# Patient Record
Sex: MALE | Race: WHITE | NOT HISPANIC OR LATINO | Employment: FULL TIME | URBAN - METROPOLITAN AREA
[De-identification: names, ages, dates, MRNs, and addresses within clinical notes are randomized per-mention and may not be internally consistent; named-entity substitution may affect disease eponyms.]

---

## 2018-04-02 ENCOUNTER — OFFICE VISIT (OUTPATIENT)
Dept: URGENT CARE | Facility: PHYSICIAN GROUP | Age: 38
End: 2018-04-02
Payer: COMMERCIAL

## 2018-04-02 VITALS
SYSTOLIC BLOOD PRESSURE: 140 MMHG | WEIGHT: 250 LBS | HEART RATE: 88 BPM | OXYGEN SATURATION: 94 % | BODY MASS INDEX: 33.13 KG/M2 | DIASTOLIC BLOOD PRESSURE: 78 MMHG | RESPIRATION RATE: 16 BRPM | TEMPERATURE: 98.6 F | HEIGHT: 73 IN

## 2018-04-02 DIAGNOSIS — J06.9 UPPER RESPIRATORY TRACT INFECTION, UNSPECIFIED TYPE: ICD-10-CM

## 2018-04-02 PROCEDURE — 99214 OFFICE O/P EST MOD 30 MIN: CPT | Performed by: PHYSICIAN ASSISTANT

## 2018-04-02 RX ORDER — FLUTICASONE PROPIONATE 50 MCG
1 SPRAY, SUSPENSION (ML) NASAL DAILY
Qty: 16 G | Refills: 0 | Status: SHIPPED | OUTPATIENT
Start: 2018-04-02 | End: 2019-05-15

## 2018-04-02 ASSESSMENT — ENCOUNTER SYMPTOMS
FEVER: 0
CHILLS: 0
RHINORRHEA: 1
DIARRHEA: 0
ABDOMINAL PAIN: 0
MYALGIAS: 0
SPUTUM PRODUCTION: 1
EYE REDNESS: 0
HEADACHES: 0
COUGH: 1
WHEEZING: 0
NECK PAIN: 0
SWOLLEN GLANDS: 0
DIZZINESS: 0
SHORTNESS OF BREATH: 0
TINGLING: 0
VOMITING: 0
EYE DISCHARGE: 0
SORE THROAT: 1

## 2018-04-02 ASSESSMENT — PAIN SCALES - GENERAL: PAINLEVEL: NO PAIN

## 2018-04-02 NOTE — LETTER
April 2, 2018         Patient: Dennis Anderson   YOB: 1980   Date of Visit: 4/2/2018           To Whom it May Concern:    Dennis Anderson was seen in my clinic on 4/2/2018. Please excuse this patient from work due to recent illness, he may return tomorrow only if symptoms have improved .    If you have any questions or concerns, please don't hesitate to call.        Sincerely,           Denis Vegas P.A.-C.  Electronically Signed

## 2018-04-02 NOTE — PROGRESS NOTES
"Subjective:      Dennis Anderson is a 38 y.o. male who presents with Flu Like Symptoms (x 5 days)            URI    This is a new problem. Episode onset: 5 days ago. The problem has been unchanged. Maximum temperature: Pos.. for subjective fevers  Associated symptoms include congestion, coughing, a plugged ear sensation, rhinorrhea and a sore throat. Pertinent negatives include no abdominal pain, chest pain, diarrhea, dysuria, ear pain, headaches, neck pain, rash, swollen glands, vomiting or wheezing. Treatments tried: Mucinex, Dayquil. The treatment provided moderate relief.       Review of Systems   Constitutional: Negative for chills, fever and malaise/fatigue.   HENT: Positive for congestion, rhinorrhea and sore throat. Negative for ear discharge and ear pain.    Eyes: Negative for discharge and redness.   Respiratory: Positive for cough and sputum production. Negative for shortness of breath and wheezing.    Cardiovascular: Negative for chest pain and leg swelling.   Gastrointestinal: Negative for abdominal pain, diarrhea and vomiting.   Genitourinary: Negative for dysuria and urgency.   Musculoskeletal: Negative for myalgias and neck pain.   Skin: Negative for itching and rash.   Neurological: Negative for dizziness, tingling and headaches.          Objective:     /78   Pulse 88   Temp 37 °C (98.6 °F)   Resp 16   Ht 1.854 m (6' 1\")   Wt 113.4 kg (250 lb)   SpO2 94%   BMI 32.98 kg/m²    PMH:  has no past medical history on file.  MEDS:   Current Outpatient Prescriptions:   •  fluticasone (FLONASE) 50 MCG/ACT nasal spray, Spray 1 Spray in nose every day., Disp: 16 g, Rfl: 0  •  azelastine (ASTELIN) 137 MCG/SPRAY nasal spray, Spray 2 Sprays in nose 2 times a day., Disp: 30 mL, Rfl: 1  ALLERGIES: No Known Allergies  SURGHX: No past surgical history on file.  SOCHX:  reports that he has never smoked. He has never used smokeless tobacco. He reports that he does not drink alcohol or use drugs.  FH: Family " history was reviewed, no pertinent findings to report    Physical Exam   Constitutional: He is oriented to person, place, and time. He appears well-developed and well-nourished.   HENT:   Head: Normocephalic and atraumatic.   Mouth/Throat: No oropharyngeal exudate.   Ears- Canals clear- TM- with clear fluid effusions bilaterally.   Pos. PND, with slight erythema- without tonsillar edema or exudate.   Mild discharge noted bilaterally- to nares.      Eyes: EOM are normal. Pupils are equal, round, and reactive to light.   Neck: Normal range of motion. Neck supple.   Cardiovascular: Normal rate and regular rhythm.    No murmur heard.  Pulmonary/Chest: Effort normal and breath sounds normal. No respiratory distress.   Musculoskeletal: Normal range of motion. He exhibits no tenderness.   Lymphadenopathy:     He has no cervical adenopathy.   Neurological: He is alert and oriented to person, place, and time.   Skin: Skin is warm. No rash noted.   Psychiatric: He has a normal mood and affect. His behavior is normal.   Vitals reviewed.              Assessment/Plan:     1. Upper respiratory tract infection, unspecified type  - fluticasone (FLONASE) 50 MCG/ACT nasal spray; Spray 1 Spray in nose every day.  Dispense: 16 g; Refill: 0    It was explained to the pt. Today that due to the viral nature of the pt's illness, we will treat symptomatically today. Encouraged OTC supportive meds PRN. Humidification, increase fluids, avoid night time dairy. Work note written.   Patient given precautionary s/sx that mandate immediate follow up and evaluation in the ED. Advised of risks of not doing so.    DDX, Supportive care, and indications for immediate follow-up discussed with patient.    Instructed to return to clinic or nearest emergency department if we are not available for any change in condition, further concerns, or worsening of symptoms.    The patient demonstrated a good understanding and agreed with the treatment plan.

## 2018-04-09 ENCOUNTER — OFFICE VISIT (OUTPATIENT)
Dept: URGENT CARE | Facility: PHYSICIAN GROUP | Age: 38
End: 2018-04-09
Payer: COMMERCIAL

## 2018-04-09 VITALS
DIASTOLIC BLOOD PRESSURE: 74 MMHG | SYSTOLIC BLOOD PRESSURE: 110 MMHG | BODY MASS INDEX: 33.13 KG/M2 | WEIGHT: 250 LBS | HEIGHT: 73 IN | OXYGEN SATURATION: 96 % | HEART RATE: 78 BPM | TEMPERATURE: 97.3 F

## 2018-04-09 DIAGNOSIS — R05.8 POST-VIRAL COUGH SYNDROME: ICD-10-CM

## 2018-04-09 PROCEDURE — 99214 OFFICE O/P EST MOD 30 MIN: CPT | Performed by: PHYSICIAN ASSISTANT

## 2018-04-09 RX ORDER — BENZONATATE 100 MG/1
100 CAPSULE ORAL 3 TIMES DAILY PRN
Qty: 60 CAP | Refills: 0 | Status: SHIPPED | OUTPATIENT
Start: 2018-04-09 | End: 2019-05-15

## 2018-04-09 ASSESSMENT — ENCOUNTER SYMPTOMS
SHORTNESS OF BREATH: 0
SINUS PAIN: 0
MUSCULOSKELETAL NEGATIVE: 1
FEVER: 0
CARDIOVASCULAR NEGATIVE: 1
CHILLS: 0
GASTROINTESTINAL NEGATIVE: 1
COUGH: 1
SORE THROAT: 0
WHEEZING: 0
NEUROLOGICAL NEGATIVE: 1

## 2018-04-09 NOTE — LETTER
April 9, 2018         Patient: Dennis Anderson   YOB: 1980   Date of Visit: 4/9/2018           To Whom it May Concern:    Dennis Anderson was seen in my clinic on 4/9/2018. He may return to work immediately.    If you have any questions or concerns, please don't hesitate to call.        Sincerely,           Alberto Norman P.A.-C.  Electronically Signed

## 2018-04-09 NOTE — PROGRESS NOTES
"Subjective:      Dennis Anderson is a 38 y.o. male who presents with Cough (x1wk dry cough )            Patient had respiratory infection last week. He is much improved today. Only symptom is dry persistent cough. Needs clearance for work.      Cough   This is a new problem. The current episode started in the past 7 days. The problem has been unchanged. The problem occurs every few minutes. The cough is productive of sputum. Pertinent negatives include no chills, ear pain, fever, postnasal drip, sore throat, shortness of breath or wheezing. He has tried OTC cough suppressant (Flonase) for the symptoms. The treatment provided mild relief. There is no history of asthma, bronchitis or pneumonia.       PMH:  has no past medical history on file.  MEDS:   Current Outpatient Prescriptions:   •  fluticasone (FLONASE) 50 MCG/ACT nasal spray, Spray 1 Spray in nose every day., Disp: 16 g, Rfl: 0  •  azelastine (ASTELIN) 137 MCG/SPRAY nasal spray, Spray 2 Sprays in nose 2 times a day., Disp: 30 mL, Rfl: 1  ALLERGIES: No Known Allergies  SURGHX: No past surgical history on file.  SOCHX:  reports that he has never smoked. He has never used smokeless tobacco. He reports that he does not drink alcohol or use drugs.  FH: family history is not on file.      Review of Systems   Constitutional: Negative for chills and fever.   HENT: Negative for congestion, ear pain, postnasal drip, sinus pain and sore throat.    Respiratory: Positive for cough. Negative for shortness of breath and wheezing.    Cardiovascular: Negative.    Gastrointestinal: Negative.    Musculoskeletal: Negative.    Neurological: Negative.        Medications, Allergies, and current problem list reviewed today in Epic     Objective:     /74   Pulse 78   Temp 36.3 °C (97.3 °F)   Ht 1.854 m (6' 1\")   Wt 113.4 kg (250 lb)   SpO2 96%   BMI 32.98 kg/m²      Physical Exam   Constitutional: He is oriented to person, place, and time. He appears well-developed and " well-nourished. No distress.   HENT:   Head: Normocephalic and atraumatic.   Right Ear: Tympanic membrane and external ear normal.   Left Ear: Tympanic membrane and external ear normal.   Nose: Nose normal.   Mouth/Throat: Oropharynx is clear and moist. No oropharyngeal exudate.   Eyes: Conjunctivae are normal. Right eye exhibits no discharge. Left eye exhibits no discharge.   Neck: Normal range of motion. Neck supple.   Cardiovascular: Normal rate, regular rhythm and normal heart sounds.    No murmur heard.  Pulmonary/Chest: Effort normal and breath sounds normal. No respiratory distress. He has no wheezes. He has no rales.   Lymphadenopathy:     He has no cervical adenopathy.   Neurological: He is alert and oriented to person, place, and time.   Skin: Skin is warm and dry. He is not diaphoretic.   Psychiatric: He has a normal mood and affect. His behavior is normal. Judgment and thought content normal.   Nursing note and vitals reviewed.              Assessment/Plan:     1. Post-viral cough syndrome  benzonatate (TESSALON) 100 MG Cap     Vitals normal, exam unremarkable, lungs clear bilaterally. Dry cough noted  OTC meds and conservative measures as discussed  Clearance for work provided  Return to clinic or go to ED if symptoms worsen or persist. Indications for ED discussed at length. Patient voices understanding. Follow-up with your primary care provider in 3-5 days. Red flags discussed. All side effects of medication discussed including allergic response, GI upset, tendon injury, etc.    Please note that this dictation was created using voice recognition software. I have made every reasonable attempt to correct obvious errors, but I expect that there are errors of grammar and possibly content that I did not discover before finalizing the note.

## 2018-12-03 ENCOUNTER — OFFICE VISIT (OUTPATIENT)
Dept: MEDICAL GROUP | Facility: MEDICAL CENTER | Age: 38
End: 2018-12-03
Payer: COMMERCIAL

## 2018-12-03 VITALS
SYSTOLIC BLOOD PRESSURE: 118 MMHG | RESPIRATION RATE: 20 BRPM | TEMPERATURE: 98.2 F | WEIGHT: 271.83 LBS | DIASTOLIC BLOOD PRESSURE: 80 MMHG | HEIGHT: 71 IN | HEART RATE: 75 BPM | BODY MASS INDEX: 38.06 KG/M2 | OXYGEN SATURATION: 93 %

## 2018-12-03 DIAGNOSIS — Z71.3 ENCOUNTER FOR WEIGHT LOSS COUNSELING: ICD-10-CM

## 2018-12-03 PROCEDURE — 99213 OFFICE O/P EST LOW 20 MIN: CPT | Performed by: FAMILY MEDICINE

## 2018-12-03 RX ORDER — PHENTERMINE HYDROCHLORIDE 37.5 MG/1
37.5 TABLET ORAL
Qty: 45 TAB | Refills: 0 | Status: SHIPPED | OUTPATIENT
Start: 2018-12-03 | End: 2019-01-14 | Stop reason: SDUPTHER

## 2018-12-03 ASSESSMENT — PATIENT HEALTH QUESTIONNAIRE - PHQ9: CLINICAL INTERPRETATION OF PHQ2 SCORE: 2

## 2018-12-03 NOTE — ASSESSMENT & PLAN NOTE
30 +  Min face to face prev health counseling   Short term phenteramine granted, risk benefit side effect discussed  Follow up 4-6 weeks for wt check  At that time we have the option to continue phenteramine for another 6 weeks or transition to long term weight loss medication such as qsysmia.  I explain that phenteramine should be limited to no more than 12 weeks per year but qsymia is approvied for long term and will help him avoid the regain that he experienced after his last wt loss attempt     Diet options discussed [employer] best diets  Accountability discussed follow up minimum q3 mo for controlled substance refills         Over 25 minutes spent with patient face to face, greater than 50% time spent with plan/coordination of care regarding that which is discussed in the HPI and A&P

## 2018-12-03 NOTE — PROGRESS NOTES
This medical record contains text that has been entered with the assistance of computer voice recognition and dictation software.  Therefore, it may contain unintended errors in text, spelling, punctuation, or grammar        Chief Complaint   Patient presents with   • Establish Care   • Other     Discuss weignt management       Dennis Anderson is a 38 y.o. male here evaluation and management of:     Discuss wt loss   Sister just moved to [place] for [employer]  He  for [employer]  Mom just retired from post office was manager of [employer]     He has had successful wt loss before with 2mo of phenteramine stimulent but quickly regained weight   Wants to discuss options         Current Outpatient Prescriptions   Medication Sig Dispense Refill   • phentermine (ADIPEX-P) 37.5 MG tablet Take 1 Tab by mouth every morning before breakfast for 45 days. 45 Tab 0   • [START ON 1/7/2019] Phentermine-Topiramate 3.75-23 MG CAPSULE SR 24 HR Take 1 Tab by mouth every day for 14 days. 14 Cap 0   • benzonatate (TESSALON) 100 MG Cap Take 1 Cap by mouth 3 times a day as needed for Cough. (Patient not taking: Reported on 12/3/2018) 60 Cap 0   • fluticasone (FLONASE) 50 MCG/ACT nasal spray Spray 1 Spray in nose every day. (Patient not taking: Reported on 12/3/2018) 16 g 0   • azelastine (ASTELIN) 137 MCG/SPRAY nasal spray Angelica 2 Sprays in nose 2 times a day. (Patient not taking: Reported on 12/3/2018) 30 mL 1     No current facility-administered medications for this visit.      Patient Active Problem List    Diagnosis Date Noted   • Encounter for weight loss counseling 12/03/2018     Past Surgical History:   Procedure Laterality Date   • EYE SURGERY Bilateral     Lasik surgery   • OTHER ORTHOPEDIC SURGERY      Nose reconstructive surgery      Social History   Substance Use Topics   • Smoking status: Never Smoker   • Smokeless tobacco: Never Used   • Alcohol use No     History reviewed. No pertinent family history.        ROS  No  "n/v  all review of system completed and negative except for those listed above     Objective:     Blood pressure 118/80, pulse 75, temperature 36.8 °C (98.2 °F), temperature source Temporal, resp. rate 20, height 1.803 m (5' 11\"), weight 123.3 kg (271 lb 13.2 oz), SpO2 93 %. Body mass index is 37.91 kg/m².  Physical Exam:        GEN: comfortable, alert and oriented, well nourished, well developed, in no apparent distress   HEENT: NCAT, eyes: pupils equal and reactive, sclera white, EOMIT, good dentition  HEART: limbs warm and well perfused, regular rate, no JVD, no lower extremity edema  LUNGS: speaking in full sentences, not in apparent respiratory distress, no audible wheezes  MSK: normal tone and bulk, no swelling of the joints, gait steady and normal           Assessment and Plan:   The following treatment plan was discussed        Problem List Items Addressed This Visit     Encounter for weight loss counseling     30 +  Min face to face prev health counseling   Short term phenteramine granted, risk benefit side effect discussed  Follow up 4-6 weeks for wt check  At that time we have the option to continue phenteramine for another 6 weeks or transition to long term weight loss medication such as qsysmia.  I explain that phenteramine should be limited to no more than 12 weeks per year but qsymia is approvied for long term and will help him avoid the regain that he experienced after his last wt loss attempt     Diet options discussed [employer] best diets  Accountability discussed follow up minimum q3 mo for controlled substance refills         Over 25 minutes spent with patient face to face, greater than 50% time spent with plan/coordination of care regarding that which is discussed in the HPI and A&P             Relevant Medications    phentermine (ADIPEX-P) 37.5 MG tablet    Phentermine-Topiramate 3.75-23 MG CAPSULE SR 24 HR (Start on 1/7/2019)    Other Relevant Orders    Lipid Profile    BASIC METABOLIC PANEL    " TSH    HEMOGLOBIN A1C                Instructed to follow up if symptoms worsen or fail to improve, ER/UC precautions discussed as well    Elvia Moreno MD  [facility], [facility] Medicine   [address] [place] [address]   [place] [address]  Phone: [phone]

## 2019-01-14 ENCOUNTER — OFFICE VISIT (OUTPATIENT)
Dept: MEDICAL GROUP | Facility: MEDICAL CENTER | Age: 39
End: 2019-01-14
Payer: COMMERCIAL

## 2019-01-14 ENCOUNTER — HOSPITAL ENCOUNTER (OUTPATIENT)
Dept: LAB | Facility: MEDICAL CENTER | Age: 39
End: 2019-01-14
Attending: FAMILY MEDICINE
Payer: COMMERCIAL

## 2019-01-14 VITALS
HEART RATE: 76 BPM | SYSTOLIC BLOOD PRESSURE: 114 MMHG | WEIGHT: 258.8 LBS | RESPIRATION RATE: 14 BRPM | TEMPERATURE: 99 F | OXYGEN SATURATION: 93 % | BODY MASS INDEX: 36.23 KG/M2 | DIASTOLIC BLOOD PRESSURE: 76 MMHG | HEIGHT: 71 IN

## 2019-01-14 DIAGNOSIS — Z71.3 ENCOUNTER FOR WEIGHT LOSS COUNSELING: ICD-10-CM

## 2019-01-14 LAB
ANION GAP SERPL CALC-SCNC: 8 MMOL/L (ref 0–11.9)
BUN SERPL-MCNC: 12 MG/DL (ref 8–22)
CALCIUM SERPL-MCNC: 9.7 MG/DL (ref 8.5–10.5)
CHLORIDE SERPL-SCNC: 103 MMOL/L (ref 96–112)
CHOLEST SERPL-MCNC: 158 MG/DL (ref 100–199)
CO2 SERPL-SCNC: 28 MMOL/L (ref 20–33)
CREAT SERPL-MCNC: 1.2 MG/DL (ref 0.5–1.4)
EST. AVERAGE GLUCOSE BLD GHB EST-MCNC: 114 MG/DL
FASTING STATUS PATIENT QL REPORTED: NORMAL
GLUCOSE SERPL-MCNC: 92 MG/DL (ref 65–99)
HBA1C MFR BLD: 5.6 % (ref 0–5.6)
HDLC SERPL-MCNC: 32 MG/DL
LDLC SERPL CALC-MCNC: 112 MG/DL
POTASSIUM SERPL-SCNC: 4.2 MMOL/L (ref 3.6–5.5)
SODIUM SERPL-SCNC: 139 MMOL/L (ref 135–145)
TRIGL SERPL-MCNC: 68 MG/DL (ref 0–149)
TSH SERPL DL<=0.005 MIU/L-ACNC: 2.11 UIU/ML (ref 0.38–5.33)

## 2019-01-14 PROCEDURE — 84443 ASSAY THYROID STIM HORMONE: CPT

## 2019-01-14 PROCEDURE — 80048 BASIC METABOLIC PNL TOTAL CA: CPT

## 2019-01-14 PROCEDURE — 99212 OFFICE O/P EST SF 10 MIN: CPT | Performed by: FAMILY MEDICINE

## 2019-01-14 PROCEDURE — 36415 COLL VENOUS BLD VENIPUNCTURE: CPT

## 2019-01-14 PROCEDURE — 83036 HEMOGLOBIN GLYCOSYLATED A1C: CPT

## 2019-01-14 PROCEDURE — 80061 LIPID PANEL: CPT

## 2019-01-14 RX ORDER — PHENTERMINE HYDROCHLORIDE 37.5 MG/1
37.5 TABLET ORAL
Qty: 45 TAB | Refills: 0 | Status: SHIPPED | OUTPATIENT
Start: 2019-01-14 | End: 2019-02-28

## 2019-01-14 ASSESSMENT — PATIENT HEALTH QUESTIONNAIRE - PHQ9: CLINICAL INTERPRETATION OF PHQ2 SCORE: 0

## 2019-01-14 NOTE — ASSESSMENT & PLAN NOTE
30 +  Min face to face prev health counseling   Short term phenteramine extended for an additional 6 weeks (total of 12 weeks has been rx'd now), risk benefit side effect discussed      He has failed to do labs so follow up next week  At that time we have option to transition to long term weight loss medication such as qsysmia.  I explain that phenteramine should be limited to no more than 12 weeks per year but qsymia is approvied for long term and will help him avoid the regain that he experienced after his last wt loss attempt   I also inform him that qsymia will likely not be covered by insurance     Diet options discussed [employer] best diets  Accountability discussed follow up minimum q3 mo for controlled substance refills         Over 25 minutes spent with patient face to face, greater than 50% time spent with plan/coordination of care regarding that which is discussed in the HPI and A&P

## 2019-01-14 NOTE — PROGRESS NOTES
This medical record contains text that has been entered with the assistance of computer voice recognition and dictation software.  Therefore, it may contain unintended errors in text, spelling, punctuation, or grammar        Chief Complaint   Patient presents with   • Follow-Up     weight management, only side effect is dry mouth       Dennis Anderson is a 38 y.o. male here evaluation and management of:     Discuss wt loss   Sister just moved to [place] for [employer]  He  for [employer]  Mom just retired from post office was manager of [employer]     He has had successful wt loss before with 2mo of phenteramine stimulent but quickly regained weight   Wants to discuss options     We provided 6 weeks of phenteramine recently and he has had 13 lbs wt loss already!      Current Outpatient Prescriptions   Medication Sig Dispense Refill   • phentermine (ADIPEX-P) 37.5 MG tablet Take 1 Tab by mouth every morning before breakfast for 45 days. 45 Tab 0   • [START ON 2/25/2019] Phentermine-Topiramate 3.75-23 MG CAPSULE SR 24 HR Take 1 Tab by mouth every day for 14 days. 14 Cap 0   • Phentermine-Topiramate 3.75-23 MG CAPSULE SR 24 HR Take 1 Tab by mouth every day for 14 days. 14 Cap 0   • benzonatate (TESSALON) 100 MG Cap Take 1 Cap by mouth 3 times a day as needed for Cough. (Patient not taking: Reported on 12/3/2018) 60 Cap 0   • fluticasone (FLONASE) 50 MCG/ACT nasal spray Spray 1 Spray in nose every day. (Patient not taking: Reported on 12/3/2018) 16 g 0   • azelastine (ASTELIN) 137 MCG/SPRAY nasal spray Harsh[place] 2 Sprays in nose 2 times a day. (Patient not taking: Reported on 12/3/2018) 30 mL 1     No current facility-administered medications for this visit.      Patient Active Problem List    Diagnosis Date Noted   • Encounter for weight loss counseling 12/03/2018     Past Surgical History:   Procedure Laterality Date   • EYE SURGERY Bilateral     Lasik surgery   • OTHER ORTHOPEDIC SURGERY      Nose reconstructive  "surgery      Social History   Substance Use Topics   • Smoking status: Never Smoker   • Smokeless tobacco: Never Used   • Alcohol use No     No family history on file.        ROS  No n/v  all review of system completed and negative except for those listed above     Objective:     Blood pressure 114/76, pulse 76, temperature 37.2 °C (99 °F), temperature source Temporal, resp. rate 14, height 1.803 m (5' 11\"), weight 117.4 kg (258 lb 12.8 oz), SpO2 93 %. Body mass index is 36.1 kg/m².  Physical Exam:        GEN: comfortable, alert and oriented, well nourished, well developed, in no apparent distress   HEENT: NCAT, eyes: pupils equal and reactive, sclera white, EOMIT, good dentition  HEART: limbs warm and well perfused, regular rate, no JVD, no lower extremity edema  LUNGS: speaking in full sentences, not in apparent respiratory distress, no audible wheezes  MSK: normal tone and bulk, no swelling of the joints, gait steady and normal           Assessment and Plan:   The following treatment plan was discussed        Problem List Items Addressed This Visit     Encounter for weight loss counseling     30 +  Min face to face prev health counseling   Short term phenteramine extended for an additional 6 weeks (total of 12 weeks has been rx'd now), risk benefit side effect discussed      He has failed to do labs so follow up next week  At that time we have option to transition to long term weight loss medication such as qsysmia.  I explain that phenteramine should be limited to no more than 12 weeks per year but qsymia is approvied for long term and will help him avoid the regain that he experienced after his last wt loss attempt   I also inform him that qsymia will likely not be covered by insurance     Diet options discussed [employer] best diets  Accountability discussed follow up minimum q3 mo for controlled substance refills         Over 25 minutes spent with patient face to face, greater than 50% time spent with " plan/coordination of care regarding that which is discussed in the HPI and A&P             Relevant Medications    phentermine (ADIPEX-P) 37.5 MG tablet    Phentermine-Topiramate 3.75-23 MG CAPSULE SR 24 HR (Start on 2/25/2019)                Instructed to follow up if symptoms worsen or fail to improve, ER/UC precautions discussed as well    Elvia Moreno MD  [facility], [address]   [place] [address]  Phone: [phone]

## 2019-01-23 ENCOUNTER — OFFICE VISIT (OUTPATIENT)
Dept: MEDICAL GROUP | Facility: MEDICAL CENTER | Age: 39
End: 2019-01-23
Payer: COMMERCIAL

## 2019-01-23 VITALS
HEART RATE: 92 BPM | TEMPERATURE: 97.6 F | HEIGHT: 71 IN | RESPIRATION RATE: 20 BRPM | OXYGEN SATURATION: 93 % | DIASTOLIC BLOOD PRESSURE: 80 MMHG | SYSTOLIC BLOOD PRESSURE: 124 MMHG | BODY MASS INDEX: 35.74 KG/M2 | WEIGHT: 255.29 LBS

## 2019-01-23 DIAGNOSIS — Z71.3 ENCOUNTER FOR WEIGHT LOSS COUNSELING: ICD-10-CM

## 2019-01-23 PROCEDURE — 99212 OFFICE O/P EST SF 10 MIN: CPT | Performed by: FAMILY MEDICINE

## 2019-01-23 NOTE — PROGRESS NOTES
This medical record contains text that has been entered with the assistance of computer voice recognition and dictation software.  Therefore, it may contain unintended errors in text, spelling, punctuation, or grammar        Chief Complaint   Patient presents with   • Weight Loss       Dennis Anderson is a 38 y.o. male here evaluation and management of:     Discuss wt loss   Sister just moved to [place] for [employer]  He  for [employer]  Mom just retired from post office was manager of [employer]     He has had successful wt loss before with 2mo of phenteramine stimulent but quickly regained weight   Wants to discuss options     We provided 6 weeks of phenteramine recently and he has had 18 lbs wt loss already!  Vitals 12/3/2018 1/14/2019 1/23/2019   WEIGHT 271.83 258.8 255.29       Current Outpatient Prescriptions   Medication Sig Dispense Refill   • Phentermine-Topiramate 15-92 MG CAPSULE SR 24 HR Take 1 Tab by mouth every day for 120 days. 30 Cap 3   • phentermine (ADIPEX-P) 37.5 MG tablet Take 1 Tab by mouth every morning before breakfast for 45 days. 45 Tab 0   • benzonatate (TESSALON) 100 MG Cap Take 1 Cap by mouth 3 times a day as needed for Cough. (Patient not taking: Reported on 12/3/2018) 60 Cap 0   • fluticasone (FLONASE) 50 MCG/ACT nasal spray Spray 1 Spray in nose every day. (Patient not taking: Reported on 12/3/2018) 16 g 0   • azelastine (ASTELIN) 137 MCG/SPRAY nasal spray [place] 2 Sprays in nose 2 times a day. (Patient not taking: Reported on 12/3/2018) 30 mL 1     No current facility-administered medications for this visit.      Patient Active Problem List    Diagnosis Date Noted   • Encounter for weight loss counseling 12/03/2018     Past Surgical History:   Procedure Laterality Date   • EYE SURGERY Bilateral     Lasik surgery   • OTHER ORTHOPEDIC SURGERY      Nose reconstructive surgery      Social History   Substance Use Topics   • Smoking status: Never Smoker   • Smokeless tobacco: Never  "Used   • Alcohol use No     No family history on file.        ROS  No n/v  all review of system completed and negative except for those listed above     Objective:     Blood pressure 124/80, pulse 92, temperature 36.4 °C (97.6 °F), temperature source Temporal, resp. rate 20, height 1.803 m (5' 11\"), weight 115.8 kg (255 lb 4.7 oz), SpO2 93 %. Body mass index is 35.61 kg/m².      Physical Exam:  GEN: comfortable, alert and oriented, well nourished, well developed, in no apparent distress   HEENT: NCAT, eyes: pupils equal and reactive, sclera white, EOMIT, good dentition  HEART: limbs warm and well perfused, regular rate, no JVD, no lower extremity edema  LUNGS: speaking in full sentences, not in apparent respiratory distress, no audible wheezes  MSK: normal tone and bulk, no swelling of the joints, gait steady and normal           Assessment and Plan:   The following treatment plan was discussed        Problem List Items Addressed This Visit     Encounter for weight loss counseling     30 +  Min face to face prev health counseling     We have rx'ed phenteramine extended for total of 12 weeks , risk benefit side effect discussed       We review labs      Now plan is  to transition to long term weight loss medication such as qsysmia.  I explain that phenteramine should be limited to no more than 12 weeks per year but qsymia is approvied for long term and will help him avoid the regain that he experienced after his last wt loss attempt   I also inform him that qsymia will likely not be covered by insurance     Diet options discussed [employer] best diets  Accountability discussed follow up minimum q3 mo for controlled substance refills   (I aleksander 4 mo rx this time only as I would like him to have coverage through my maternity leave)       Over 25 minutes spent with patient face to face, greater than 50% time spent with plan/coordination of care regarding that which is discussed in the HPI and A&P      30+ min prev health " counseling face to face                 Relevant Medications    Phentermine-Topiramate 15-92 MG CAPSULE SR 24 HR                Instructed to follow up if symptoms worsen or fail to improve, ER/UC precautions discussed as well    Elvia Moreno MD  [facility], [address]   [place] [address]  Phone: [phone]

## 2019-01-23 NOTE — ASSESSMENT & PLAN NOTE
30 +  Min face to face prev health counseling     We have rx'ed phenteramine extended for total of 12 weeks , risk benefit side effect discussed       We review labs      Now plan is  to transition to long term weight loss medication such as qsysmia.  I explain that phenteramine should be limited to no more than 12 weeks per year but qsymia is approvied for long term and will help him avoid the regain that he experienced after his last wt loss attempt   I also inform him that qsymia will likely not be covered by insurance     Diet options discussed [employer] best diets  Accountability discussed follow up minimum q3 mo for controlled substance refills   (I aleksander 4 mo rx this time only as I would like him to have coverage through my maternity leave)       Over 25 minutes spent with patient face to face, greater than 50% time spent with plan/coordination of care regarding that which is discussed in the HPI and A&P      30+ min prev health counseling face to face

## 2019-04-02 ENCOUNTER — OFFICE VISIT (OUTPATIENT)
Dept: URGENT CARE | Facility: PHYSICIAN GROUP | Age: 39
End: 2019-04-02

## 2019-04-02 VITALS
RESPIRATION RATE: 16 BRPM | OXYGEN SATURATION: 94 % | HEART RATE: 86 BPM | TEMPERATURE: 98.1 F | WEIGHT: 248 LBS | BODY MASS INDEX: 34.72 KG/M2 | SYSTOLIC BLOOD PRESSURE: 130 MMHG | HEIGHT: 71 IN | DIASTOLIC BLOOD PRESSURE: 92 MMHG

## 2019-04-02 DIAGNOSIS — J02.9 VIRAL PHARYNGITIS: ICD-10-CM

## 2019-04-02 LAB
INT CON NEG: NORMAL
INT CON POS: NORMAL
S PYO AG THROAT QL: NORMAL

## 2019-04-02 PROCEDURE — 87880 STREP A ASSAY W/OPTIC: CPT | Performed by: FAMILY MEDICINE

## 2019-04-02 PROCEDURE — 99214 OFFICE O/P EST MOD 30 MIN: CPT | Performed by: FAMILY MEDICINE

## 2019-04-02 RX ORDER — FLUTICASONE PROPIONATE 50 MCG
1 SPRAY, SUSPENSION (ML) NASAL 2 TIMES DAILY
Qty: 1 BOTTLE | Refills: 0 | Status: SHIPPED | OUTPATIENT
Start: 2019-04-02 | End: 2019-05-15

## 2019-04-03 NOTE — PROGRESS NOTES
"Subjective:     CC:  presents with Pharyngitis            Pharyngitis   This is a new problem. The current episode started in the past 2 days. The problem has been unchanged. There has been no fever. The pain is moderate. Associated symptoms include: none . Pertinent negatives include no abdominal pain, congestion, coughing, diarrhea, headaches, shortness of breath or vomiting. no exposure to strep or mono.   has tried acetaminophen for the symptoms. The treatment provided mild relief.     Social History   Substance Use Topics   • Smoking status: Never Smoker   • Smokeless tobacco: Never Used   • Alcohol use No        Past medical history was unremarkable and not pertinent to current issue  Family history was reviewed and not pertinent           Review of Systems   Constitutional: Negative for fever, chills and malaise/fatigue.   Eyes: Negative for vision changes, d/c.    Respiratory: Negative for cough and sputum production.    Cardiovascular: Negative for chest pain and palpitations.   Gastrointestinal: Negative for nausea, vomiting, abdominal pain, diarrhea and constipation.   Genitourinary: Negative for dysuria, urgency and frequency.   Skin: Negative for rash or  itching.   Neurological: Negative for dizziness and tingling.   Psychiatric/Behavioral: Negative for depression.   Hematologic/lymphatic - denies bruising or excessive bleeding  All other systems reviewed and are negative.           Objective:   Blood pressure 130/92, pulse 86, temperature 36.7 °C (98.1 °F), temperature source Temporal, resp. rate 16, height 1.803 m (5' 11\"), weight 112.5 kg (248 lb), SpO2 94 %.        Physical Exam   Constitutional:   oriented to person, place, and time.  appears well-developed and well-nourished. No distress.   HENT:   Head: Normocephalic and atraumatic.   Right Ear: External ear normal.   Left Ear: External ear normal.   Nose: Mucosal edema present. Right sinus exhibits no maxillary sinus tenderness and no frontal " sinus tenderness. Left sinus exhibits no maxillary sinus tenderness and no frontal sinus tenderness.   Mouth/Throat: no posterior oropharyngeal exudate.   There is posterior oropharyngeal erythema present. No posterior oropharyngeal edema.   Tonsils 1+ bilaterally     Eyes: Conjunctivae and EOM are normal. Pupils are equal, round, and reactive to light. Right eye exhibits no discharge. Left eye exhibits no discharge. No scleral icterus.   Neck: Normal range of motion. Neck supple. No JVD present. No tracheal deviation present. No thyromegaly present.   Cardiovascular: Normal rate, regular rhythm, normal heart sounds and intact distal pulses.  Exam reveals no friction rub.    No murmur heard.  Pulmonary/Chest: Effort normal and breath sounds normal. No respiratory distress.   no wheezes.   no rales.    Musculoskeletal:  exhibits no edema.   Lymphadenopathy:     Positive rt Cervical adenopathy.   Neurological:   alert and oriented to person, place, and time.   Skin: Skin is warm and dry. No erythema.   Psychiatric:   normal mood and affect.   Nursing note and vitals reviewed.             Assessment/Plan:     1. Viral pharyngitis   rapid strep neg    - fluticasone (FLONASE) 50 MCG/ACT nasal spray; Spray 1 Spray in nose 2 times a day.  Dispense: 1 Bottle; Refill: 0       Follow up in one week if no improvement, sooner if symptoms worsen.

## 2019-05-15 ENCOUNTER — OFFICE VISIT (OUTPATIENT)
Dept: MEDICAL GROUP | Facility: MEDICAL CENTER | Age: 39
End: 2019-05-15
Payer: COMMERCIAL

## 2019-05-15 VITALS
OXYGEN SATURATION: 94 % | WEIGHT: 243.39 LBS | BODY MASS INDEX: 34.07 KG/M2 | RESPIRATION RATE: 20 BRPM | DIASTOLIC BLOOD PRESSURE: 78 MMHG | SYSTOLIC BLOOD PRESSURE: 122 MMHG | HEART RATE: 76 BPM | HEIGHT: 71 IN | TEMPERATURE: 97.8 F

## 2019-05-15 DIAGNOSIS — Z71.3 ENCOUNTER FOR WEIGHT LOSS COUNSELING: ICD-10-CM

## 2019-05-15 PROCEDURE — 99212 OFFICE O/P EST SF 10 MIN: CPT | Performed by: FAMILY MEDICINE

## 2019-05-15 NOTE — PROGRESS NOTES
"This medical record contains text that has been entered with the assistance of computer voice recognition and dictation software.  Therefore, it may contain unintended errors in text, spelling, punctuation, or grammar        Chief Complaint   Patient presents with   • Weight Loss     follow up       Dennis Anderson is a 39 y.o. 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 30 Cap 2   • Phentermine-Topiramate 15-92 MG CAPSULE SR 24 HR Take 1 Tab by mouth every day for 120 days. 30 Cap 3     No current facility-administered medications for this visit.      Patient Active Problem List    Diagnosis Date Noted   • Encounter for weight loss counseling 12/03/2018     Past Surgical History:   Procedure Laterality Date   • EYE SURGERY Bilateral     Lasik surgery   • OTHER ORTHOPEDIC SURGERY      Nose reconstructive surgery      Social History   Substance Use Topics   • Smoking status: Never Smoker   • Smokeless tobacco: Never Used   • Alcohol use No     History reviewed. No pertinent family history.        ROS  No n/v  all review of system completed and negative except for those listed above     Objective:     /78 (Patient Position: Sitting)   Pulse 76   Temp 36.6 °C (97.8 °F) (Temporal)   Resp 20   Ht 1.803 m (5' 11\")   Wt 110.4 kg (243 lb 6.2 oz)   SpO2 94%  Body mass index is 33.95 kg/m².      Physical Exam:  GEN: comfortable, " alert and oriented, well nourished, well developed, in no apparent distress   HEENT: NCAT, eyes: pupils equal and reactive, sclera white, EOMIT, good dentition  HEART: limbs warm and well perfused, regular rate, no JVD, no lower extremity edema  LUNGS: speaking in full sentences, not in apparent respiratory distress, no audible wheezes  MSK: normal tone and bulk, no swelling of the joints, gait steady and normal           Assessment and Plan:   The following treatment plan was discussed        Problem List Items Addressed This Visit     Encounter for weight loss counseling     30 +  Min face to face prev health counseling     We had rx'ed phenteramine extended for total of 12 weeks , then transitioned to qsymia       We review labs       qsymia is approvied for long term and will help him avoid the regain that he experienced after his last wt loss attempt       Diet options discussed [employer] best diets  Accountability discussed follow up minimum q3 mo for controlled substance refills         Over 25 minutes spent with patient face to face, greater than 50% time spent with plan/coordination of care regarding that which is discussed in the HPI and A&P      30+ min prev health counseling face to face                 Relevant Medications    Phentermine-Topiramate 15-92 MG CAPSULE SR 24 HR                Instructed to follow up if symptoms worsen or fail to improve, ER/UC precautions discussed as well    Elvia Moreno MD  [facility], Family Medicine   [address] [place] [address]   Doug MARTELL [id number]  Phone: [phone]

## 2019-05-15 NOTE — ASSESSMENT & PLAN NOTE
30 +  Min face to face prev health counseling     We had rx'ed phenteramine extended for total of 12 weeks , then transitioned to qsymia       We review labs       qsymia is approvied for long term and will help him avoid the regain that he experienced after his last wt loss attempt       Diet options discussed [employer] best diets  Accountability discussed follow up minimum q3 mo for controlled substance refills         Over 25 minutes spent with patient face to face, greater than 50% time spent with plan/coordination of care regarding that which is discussed in the HPI and A&P      30+ min prev health counseling face to face

## 2019-05-30 ENCOUNTER — TELEPHONE (OUTPATIENT)
Dept: MEDICAL GROUP | Facility: MEDICAL CENTER | Age: 39
End: 2019-05-30

## 2019-05-30 DIAGNOSIS — Z71.3 ENCOUNTER FOR WEIGHT LOSS COUNSELING: ICD-10-CM

## 2019-05-30 RX ORDER — TOPIRAMATE 100 MG/1
100 TABLET, FILM COATED ORAL 2 TIMES DAILY
Qty: 60 TAB | Refills: 2 | Status: SHIPPED | OUTPATIENT
Start: 2019-05-30 | End: 2019-08-21 | Stop reason: SDUPTHER

## 2019-05-30 NOTE — TELEPHONE ENCOUNTER
VOICEMAIL  1. Caller Name: Dennis Anderson                      Call Back Number: [phone] (home)     2. Message: Pt called states that pt wife is taking the phentermine and totipalmate separately not as qsemia, would like to know if he can get that same rx because the price is significantly less. Please advise.     3. Patient approves office to leave a detailed voicemail/MyChart message: N\A

## 2019-05-30 NOTE — TELEPHONE ENCOUNTER
Phone Number Called: [phone] (home)     Call outcome: left message for patient to call back regarding message below    Message: Unable to reach pt ok to  medication, please have him bring old rx

## 2019-05-30 NOTE — TELEPHONE ENCOUNTER
1. Caller Name: Dennis Anderson                      Call Back Number: [phone] (home)     2. Message: Pt notified that medications were prescribed. He will  at front office tomorrow. He is bringing the old scripts to [facility].     3. Patient approves office to leave a detailed voicemail/MyChart message: N\A

## 2019-05-31 ENCOUNTER — TELEPHONE (OUTPATIENT)
Dept: MEDICAL GROUP | Facility: MEDICAL CENTER | Age: 39
End: 2019-05-31

## 2019-05-31 NOTE — TELEPHONE ENCOUNTER
Patient came in to  RX for Phentermine, [facility]

## 2019-08-21 DIAGNOSIS — Z71.3 ENCOUNTER FOR WEIGHT LOSS COUNSELING: ICD-10-CM

## 2019-08-21 RX ORDER — TOPIRAMATE 100 MG/1
100 TABLET, FILM COATED ORAL 2 TIMES DAILY
Qty: 180 TAB | Refills: 1 | Status: SHIPPED | OUTPATIENT
Start: 2019-08-21 | End: 2021-10-19

## 2019-08-21 NOTE — TELEPHONE ENCOUNTER
Was the patient seen in the last year in this department? Yes    Does patient have an active prescription for medications requested? No     Received Request Via: Pharmacy     Future Appointments       Provider Department [place]    9/4/2019 8:40 AM Elvia oMreno M.D. [facility] [place]

## 2019-11-12 ENCOUNTER — OFFICE VISIT (OUTPATIENT)
Dept: URGENT CARE | Facility: PHYSICIAN GROUP | Age: 39
End: 2019-11-12
Payer: COMMERCIAL

## 2019-11-12 VITALS
BODY MASS INDEX: 35.21 KG/M2 | DIASTOLIC BLOOD PRESSURE: 78 MMHG | HEIGHT: 72 IN | TEMPERATURE: 98 F | SYSTOLIC BLOOD PRESSURE: 108 MMHG | WEIGHT: 260 LBS | OXYGEN SATURATION: 96 % | HEART RATE: 88 BPM

## 2019-11-12 DIAGNOSIS — H00.011 HORDEOLUM EXTERNUM OF RIGHT UPPER EYELID: Primary | ICD-10-CM

## 2019-11-12 PROCEDURE — 99214 OFFICE O/P EST MOD 30 MIN: CPT | Performed by: PHYSICIAN ASSISTANT

## 2019-11-12 RX ORDER — ERYTHROMYCIN 5 MG/G
1 OINTMENT OPHTHALMIC 3 TIMES DAILY
Qty: 1 TUBE | Refills: 0 | Status: SHIPPED | OUTPATIENT
Start: 2019-11-12 | End: 2019-11-17

## 2019-11-12 ASSESSMENT — ENCOUNTER SYMPTOMS
FEVER: 0
BLURRED VISION: 0
NAUSEA: 0
DOUBLE VISION: 0
SHORTNESS OF BREATH: 0
EYE REDNESS: 0
DIARRHEA: 0
CONSTIPATION: 0
EYE ITCHING: 0
COUGH: 0
CHILLS: 0
ABDOMINAL PAIN: 0
EYE PAIN: 1
EYE DISCHARGE: 0
VOMITING: 0

## 2019-11-12 NOTE — PROGRESS NOTES
Subjective:   Dennis Anderson is a 39 y.o. male who presents for Stye (right eyelid )        Eye Problem    The right eye is affected. This is a new problem. The current episode started yesterday. The problem occurs constantly. The problem has been unchanged. There is no known exposure to pink eye. He does not wear contacts. Pertinent negatives include no blurred vision, eye discharge, double vision, eye redness, fever, itching, nausea or vomiting. He has tried nothing for the symptoms.     Review of Systems   Constitutional: Negative for chills, fever and malaise/fatigue.   Eyes: Positive for pain. Negative for blurred vision, double vision, discharge, redness and itching.   Respiratory: Negative for cough and shortness of breath.    Gastrointestinal: Negative for abdominal pain, constipation, diarrhea, nausea and vomiting.   All other systems reviewed and are negative.      PMH:  has no past medical history on file.  MEDS:   Current Outpatient Medications:   •  erythromycin 5 MG/GM Ointment, Place 1 Application in right eye 3 times a day for 5 days., Disp: 1 Tube, Rfl: 0  •  topiramate (TOPAMAX) 100 MG Tab, Take 1 Tab by mouth 2 times a day. (Patient not taking: Reported on 11/12/2019), Disp: 180 Tab, Rfl: 1  ALLERGIES: No Known Allergies  SURGHX:   Past Surgical History:   Procedure Laterality Date   • EYE SURGERY Bilateral     Lasik surgery   • OTHER ORTHOPEDIC SURGERY      Nose reconstructive surgery     SOCHX:  reports that he has never smoked. He has never used smokeless tobacco. He reports that he does not drink alcohol or use drugs.  History reviewed. No pertinent family history.     Objective:   /78 (BP Location: Left arm, Patient Position: Sitting, BP Cuff Size: Large adult)   Pulse 88   Temp 36.7 °C (98 °F) (Temporal)   Ht 1.829 m (6')   Wt 117.9 kg (260 lb)   SpO2 96%   BMI 35.26 kg/m²     Physical Exam  Vitals signs reviewed.   Constitutional:       General: He is not in acute distress.   Appearance: He is well-developed.   HENT:      Head: Normocephalic and atraumatic.      Right Ear: External ear normal.      Left Ear: External ear normal.      Nose: Nose normal.   Eyes:      Extraocular Movements: Extraocular movements intact.      Conjunctiva/sclera: Conjunctivae normal.      Right eye: Right conjunctiva is not injected.      Pupils: Pupils are equal, round, and reactive to light.     Neck:      Musculoskeletal: Normal range of motion and neck supple.      Trachea: No tracheal deviation.   Cardiovascular:      Rate and Rhythm: Normal rate and regular rhythm.   Pulmonary:      Effort: Pulmonary effort is normal.      Breath sounds: Normal breath sounds.   Skin:     General: Skin is warm and dry.      Capillary Refill: Capillary refill takes less than 2 seconds.   Neurological:      Mental Status: He is alert and oriented to person, place, and time.   Psychiatric:         Behavior: Behavior normal.           Assessment/Plan:     1. Hordeolum externum of right upper eyelid  erythromycin 5 MG/GM Ointment     Supportive care reviewed. Stye handout provided.     Follow-up with primary care provider.  If symptoms worsen or persist patient can return to clinic for reevaluation. All side effects of medication discussed including allergic response, GI upset, tendon injury, etc. Patient confirmed understanding of information.    Please note that this dictation was created using voice recognition software. I have made every reasonable attempt to correct obvious errors, but I expect that there are errors of grammar and possibly content that I did not discover before finalizing the note.

## 2020-03-16 ENCOUNTER — OFFICE VISIT (OUTPATIENT)
Dept: URGENT CARE | Facility: CLINIC | Age: 40
End: 2020-03-16
Payer: COMMERCIAL

## 2020-03-16 VITALS
OXYGEN SATURATION: 95 % | RESPIRATION RATE: 20 BRPM | WEIGHT: 260 LBS | BODY MASS INDEX: 36.4 KG/M2 | HEART RATE: 93 BPM | TEMPERATURE: 99.2 F | HEIGHT: 71 IN | SYSTOLIC BLOOD PRESSURE: 130 MMHG | DIASTOLIC BLOOD PRESSURE: 76 MMHG

## 2020-03-16 DIAGNOSIS — J10.1 INFLUENZA A: ICD-10-CM

## 2020-03-16 DIAGNOSIS — R05.9 COUGH: ICD-10-CM

## 2020-03-16 LAB
FLUAV+FLUBV AG SPEC QL IA: NORMAL
INT CON NEG: NORMAL
INT CON POS: NORMAL

## 2020-03-16 PROCEDURE — 99213 OFFICE O/P EST LOW 20 MIN: CPT | Performed by: NURSE PRACTITIONER

## 2020-03-16 PROCEDURE — 87804 INFLUENZA ASSAY W/OPTIC: CPT | Performed by: NURSE PRACTITIONER

## 2020-03-16 ASSESSMENT — ENCOUNTER SYMPTOMS
MYALGIAS: 1
DIARRHEA: 0
CHILLS: 1
FEVER: 1
SPUTUM PRODUCTION: 1
COUGH: 1
SORE THROAT: 1
NAUSEA: 0
HEADACHES: 0
RHINORRHEA: 1

## 2020-03-16 NOTE — PROGRESS NOTES
Subjective:      Dennis Anderson is a 39 y.o. male who presents with Cough (x4days, fever, cough, sore throat)    Patient with no known contacts to anyone with Covid19 and no travel in past 14 days.            Cough   This is a new problem. The current episode started in the past 7 days. The problem has been gradually improving. The cough is productive of sputum. Associated symptoms include chills, a fever, myalgias, nasal congestion, postnasal drip, rhinorrhea and a sore throat. Pertinent negatives include no headaches. Associated symptoms comments: Feels fever broke last night. . He has tried OTC cough suppressant for the symptoms. The treatment provided mild relief.        Patient has no known allergies.  Current Outpatient Medications on File Prior to Visit   Medication Sig Dispense Refill   • topiramate (TOPAMAX) 100 MG Tab Take 1 Tab by mouth 2 times a day. (Patient not taking: Reported on 11/12/2019) 180 Tab 1     No current facility-administered medications on file prior to visit.      Social History     Socioeconomic History   • Marital status: Single     Spouse name: Not on file   • Number of children: Not on file   • Years of education: Not on file   • Highest education level: Not on file   Occupational History   • Not on file   Social Needs   • Financial resource strain: Not on file   • Food insecurity     Worry: Not on file     Inability: Not on file   • Transportation needs     Medical: Not on file     Non-medical: Not on file   Tobacco Use   • Smoking status: Never Smoker   • Smokeless tobacco: Never Used   Substance and Sexual Activity   • Alcohol use: No   • Drug use: No   • Sexual activity: Not Currently   Lifestyle   • Physical activity     Days per week: Not on file     Minutes per session: Not on file   • Stress: Not on file   Relationships   • Social connections     Talks on phone: Not on file     Gets together: Not on file     Attends Scientologist service: Not on file     Active member of club or  organization: Not on file     Attends meetings of clubs or organizations: Not on file     Relationship status: Not on file   • Intimate partner violence     Fear of current or ex partner: Not on file     Emotionally abused: Not on file     Physically abused: Not on file     Forced sexual activity: Not on file   Other Topics Concern   • Not on file   Social History Narrative   • Not on file     Breast Cancer-related family history is not on file.      Review of Systems   Constitutional: Positive for chills and fever.        Subjective.   HENT: Positive for congestion, postnasal drip, rhinorrhea and sore throat.    Respiratory: Positive for cough and sputum production.    Gastrointestinal: Negative for diarrhea and nausea.   Musculoskeletal: Positive for myalgias.   Neurological: Negative for headaches.          Objective:     There were no vitals taken for this visit.     Physical Exam  Vitals signs and nursing note reviewed.   Constitutional:       General: He is not in acute distress.     Appearance: He is well-developed.   HENT:      Head: Normocephalic and atraumatic.      Right Ear: Ear canal and external ear normal. No middle ear effusion. Tympanic membrane is not injected or perforated.      Left Ear: Ear canal and external ear normal.  No middle ear effusion. Tympanic membrane is not injected or perforated.      Nose: Mucosal edema, congestion and rhinorrhea present.      Mouth/Throat:      Mouth: Mucous membranes are moist.      Pharynx: Oropharynx is clear. Posterior oropharyngeal erythema present. No oropharyngeal exudate.   Eyes:      General:         Right eye: No discharge.         Left eye: No discharge.      Conjunctiva/sclera: Conjunctivae normal.   Neck:      Musculoskeletal: Normal range of motion and neck supple.   Cardiovascular:      Rate and Rhythm: Normal rate and regular rhythm.      Heart sounds: Normal heart sounds. No murmur.   Pulmonary:      Effort: Pulmonary effort is normal. No  respiratory distress.      Breath sounds: Normal breath sounds.   Musculoskeletal: Normal range of motion.      Comments: Normal movement of all 4 extremities.   Lymphadenopathy:      Cervical: No cervical adenopathy.      Upper Body:      Right upper body: No supraclavicular adenopathy.      Left upper body: No supraclavicular adenopathy.   Skin:     General: Skin is warm and dry.   Neurological:      Mental Status: He is alert and oriented to person, place, and time.      Gait: Gait normal.   Psychiatric:         Behavior: Behavior normal.         Thought Content: Thought content normal.                 Assessment/Plan:       1. Cough  POCT Influenza A/B   2. Influenza A       Positive influenza A  He is 4 days in and improving.  Viral illness at this time with no indication for antibiotics. Reviewed with patient expected course of illness and also reviewed OTC medications that may be used for symptom relief. Follow up 7-10 days if not improving.

## 2020-03-16 NOTE — LETTER
March 16, 2020        Dennis Anderson  [address] [place] [id number]        Dennis was seen in our clinic today and he is excused from work for today and tomorrow. He is cleared to return on Wednesday.  If you have any questions or concerns, please don't hesitate to call.        Sincerely,        JACK Larson.P.PETER.    Electronically Signed

## 2021-10-19 ENCOUNTER — OFFICE VISIT (OUTPATIENT)
Dept: MEDICAL GROUP | Facility: MEDICAL CENTER | Age: 41
End: 2021-10-19
Payer: COMMERCIAL

## 2021-10-19 VITALS
WEIGHT: 292 LBS | DIASTOLIC BLOOD PRESSURE: 72 MMHG | HEIGHT: 72 IN | SYSTOLIC BLOOD PRESSURE: 120 MMHG | HEART RATE: 66 BPM | RESPIRATION RATE: 12 BRPM | TEMPERATURE: 98.2 F | OXYGEN SATURATION: 95 % | BODY MASS INDEX: 39.55 KG/M2

## 2021-10-19 DIAGNOSIS — Z71.3 ENCOUNTER FOR WEIGHT LOSS COUNSELING: ICD-10-CM

## 2021-10-19 DIAGNOSIS — Z23 NEED FOR VACCINATION: ICD-10-CM

## 2021-10-19 PROCEDURE — 90471 IMMUNIZATION ADMIN: CPT | Performed by: FAMILY MEDICINE

## 2021-10-19 PROCEDURE — 99214 OFFICE O/P EST MOD 30 MIN: CPT | Mod: 25 | Performed by: FAMILY MEDICINE

## 2021-10-19 PROCEDURE — 90686 IIV4 VACC NO PRSV 0.5 ML IM: CPT | Performed by: FAMILY MEDICINE

## 2021-10-19 RX ORDER — NALTREXONE HYDROCHLORIDE AND BUPROPION HYDROCHLORIDE 8; 90 MG/1; MG/1
TABLET, EXTENDED RELEASE ORAL
Qty: 360 TABLET | Refills: 0 | Status: SHIPPED | OUTPATIENT
Start: 2021-10-19 | End: 2021-12-06 | Stop reason: SDUPTHER

## 2021-10-19 RX ORDER — PHENTERMINE HYDROCHLORIDE 37.5 MG/1
37.5 TABLET ORAL
Qty: 30 TABLET | Refills: 0 | Status: SHIPPED | OUTPATIENT
Start: 2021-10-19 | End: 2021-11-18

## 2021-10-19 ASSESSMENT — PATIENT HEALTH QUESTIONNAIRE - PHQ9: CLINICAL INTERPRETATION OF PHQ2 SCORE: 0

## 2021-10-19 NOTE — ASSESSMENT & PLAN NOTE
In the past he has had successful wt loss with phentermine and did not struggle with elevated blood pressure   We transitioned him to qsymia for safer long term option and he had success with this as well   He unfortunately was lost to follow up as he has a hard time getting time off work   I explain that now we can utilized telemed   He should consider investing in blood pressure cuff to monitor blood pressure remotely while on stimulant but should definitely consider investing in a scale   Phentermine for now , follow up 1 mo, at that point we can extend phentermine short term only or transition to qsymia or contrave. He is interested in contrave as he disliked the carbonation taste change with qsymia     30+ min spent

## 2021-10-19 NOTE — PROGRESS NOTES
This medical record contains text that has been entered with the assistance of computer voice recognition and dictation software.  Therefore, it may contain unintended errors in text, spelling, punctuation, or grammar        Chief Complaint   Patient presents with   • Other     Weight Loss Options   •             Dennis Anderson is a 41 y.o. male here evaluation and management of:   Has been a couple years since our last visit   He was promoted a couple times this past year and with this promotion extra responsibility   Has worked for his employer for 16 + years       He feels well in his usual state of health interested in restart weight loss     Current Outpatient Medications   Medication Sig Dispense Refill   • phentermine (ADIPEX-P) 37.5 MG tablet Take 1 Tablet by mouth every morning before breakfast for 30 days. 30 Tablet 0   • Naltrexone-buPROPion HCl ER (CONTRAVE) 8-90 MG TABLET SR 12 HR 1 tab daily for 1 week, then 1 tab bid x 1 week then, 2tabs qam and 1 tab pm x 1 week, then 2 tabs bid indefinitely 360 Tablet 0   • topiramate (TOPAMAX) 100 MG Tab Take 1 Tab by mouth 2 times a day. (Patient not taking: Reported on 11/12/2019) 180 Tab 1     No current facility-administered medications for this visit.     Patient Active Problem List    Diagnosis Date Noted   • Encounter for weight loss counseling 12/03/2018     Past Surgical History:   Procedure Laterality Date   • EYE SURGERY Bilateral     Lasik surgery   • OTHER ORTHOPEDIC SURGERY      Nose reconstructive surgery      Social History     Tobacco Use   • Smoking status: Never Smoker   • Smokeless tobacco: Never Used   Vaping Use   • Vaping Use: Never used   Substance Use Topics   • Alcohol use: No   • Drug use: No     History reviewed. No pertinent family history.        ROS    all review of system completed and negative except for those listed above     Objective:     /72 (BP Location: Right arm, Patient Position: Sitting, BP Cuff Size: Adult)   Pulse  66   Temp 36.8 °C (98.2 °F) (Temporal)   Resp 12   Ht 1.829 m (6')   Wt (!) 132 kg (292 lb)   SpO2 95%  Body mass index is 39.6 kg/m².  Physical Exam:    Constitutional: Alert, no distress.  Skin: Warm, dry, good turgor, no rashes in visible areas.  Eye: Equal, round and reactive, conjunctiva clear, lids normal.  ENMT: Lips without lesions, good dentition, oropharynx clear.  Neck: Trachea midline, no masses, no thyromegaly. No cervical or supraclavicular lymphadenopathy.  Respiratory: Unlabored respiratory effort, lungs clear to auscultation, no wheezes, no ronchi.  Cardiovascular: Normal S1, S2, no murmur, no edema.  Abdomen: Soft, non-tender, no masses, no hepatosplenomegaly.  Psych: Alert and oriented x3, normal affect and mood.          Assessment and Plan:   The following treatment plan was discussed        Problem List Items Addressed This Visit     Encounter for weight loss counseling     In the past he has had successful wt loss with phentermine and did not struggle with elevated blood pressure   We transitioned him to qsymia for safer long term option and he had success with this as well   He unfortunately was lost to follow up as he has a hard time getting time off work   I explain that now we can utilized telemed   He should consider investing in blood pressure cuff to monitor blood pressure remotely while on stimulant but should definitely consider investing in a scale   Phentermine for now , follow up 1 mo, at that point we can extend phentermine short term only or transition to qsymia or contrave. He is interested in contrave as he disliked the carbonation taste change with qsymia     30+ min spent              Relevant Medications    phentermine (ADIPEX-P) 37.5 MG tablet    Naltrexone-buPROPion HCl ER (CONTRAVE) 8-90 MG TABLET SR 12 HR    Other Relevant Orders    Basic Metabolic Panel    Lipid Profile      Other Visit Diagnoses     Need for vaccination        Relevant Orders    INFLUENZA VACCINE  QUAD INJ (PF) (Completed)                Instructed to follow up if symptoms worsen or fail to improve, ER/UC precautions discussed as well    Elvia Moreno MD  [facility], Family Medicine   [address] [place] [address]   [place] [address]  Phone: [phone]

## 2021-11-02 ENCOUNTER — HOSPITAL ENCOUNTER (OUTPATIENT)
Dept: LAB | Facility: MEDICAL CENTER | Age: 41
End: 2021-11-02
Attending: FAMILY MEDICINE
Payer: COMMERCIAL

## 2021-11-02 DIAGNOSIS — Z71.3 ENCOUNTER FOR WEIGHT LOSS COUNSELING: ICD-10-CM

## 2021-11-02 LAB
ANION GAP SERPL CALC-SCNC: 11 MMOL/L (ref 7–16)
BUN SERPL-MCNC: 11 MG/DL (ref 8–22)
CALCIUM SERPL-MCNC: 9.9 MG/DL (ref 8.5–10.5)
CHLORIDE SERPL-SCNC: 100 MMOL/L (ref 96–112)
CHOLEST SERPL-MCNC: 167 MG/DL (ref 100–199)
CO2 SERPL-SCNC: 24 MMOL/L (ref 20–33)
CREAT SERPL-MCNC: 1.03 MG/DL (ref 0.5–1.4)
GLUCOSE SERPL-MCNC: 76 MG/DL (ref 65–99)
HDLC SERPL-MCNC: 33 MG/DL
LDLC SERPL CALC-MCNC: 122 MG/DL
POTASSIUM SERPL-SCNC: 4.1 MMOL/L (ref 3.6–5.5)
SODIUM SERPL-SCNC: 135 MMOL/L (ref 135–145)
TRIGL SERPL-MCNC: 59 MG/DL (ref 0–149)

## 2021-11-02 PROCEDURE — 80048 BASIC METABOLIC PNL TOTAL CA: CPT

## 2021-11-02 PROCEDURE — 80061 LIPID PANEL: CPT

## 2021-11-02 PROCEDURE — 36415 COLL VENOUS BLD VENIPUNCTURE: CPT

## 2021-11-18 ENCOUNTER — OFFICE VISIT (OUTPATIENT)
Dept: MEDICAL GROUP | Facility: MEDICAL CENTER | Age: 41
End: 2021-11-18
Payer: COMMERCIAL

## 2021-11-18 VITALS
OXYGEN SATURATION: 93 % | SYSTOLIC BLOOD PRESSURE: 126 MMHG | HEART RATE: 80 BPM | HEIGHT: 71 IN | RESPIRATION RATE: 16 BRPM | DIASTOLIC BLOOD PRESSURE: 84 MMHG | BODY MASS INDEX: 38.58 KG/M2 | TEMPERATURE: 98.1 F | WEIGHT: 275.57 LBS

## 2021-11-18 DIAGNOSIS — Z71.3 ENCOUNTER FOR WEIGHT LOSS COUNSELING: ICD-10-CM

## 2021-11-18 PROCEDURE — 99214 OFFICE O/P EST MOD 30 MIN: CPT | Performed by: FAMILY MEDICINE

## 2021-11-18 RX ORDER — PHENTERMINE HYDROCHLORIDE 37.5 MG/1
37.5 TABLET ORAL
Qty: 14 TABLET | Refills: 0 | Status: SHIPPED | OUTPATIENT
Start: 2021-11-18 | End: 2021-12-02

## 2021-11-18 NOTE — ASSESSMENT & PLAN NOTE
In the past he has had successful wt loss with phentermine and did not struggle with elevated blood pressure   We transitioned him to qsymia for safer long term option and he had success with this as well   He unfortunately was lost to follow up as he has a hard time getting time off work   He now plans to utilize telemed     He will invest in a scale for weekly weights      He has lost nearly 20 lbs with phentermine and portion control!   We limit short release stimulant to 6 weeks as this is not safe generally for long term use   Plan to transition to contrave. He is interested in contrave as he disliked the carbonation taste change with qsymia   He will let us know if he chooses cupon or mail order for contrave    30+ min spent

## 2021-11-18 NOTE — PROGRESS NOTES
"This medical record contains text that has been entered with the assistance of computer voice recognition and dictation software.  Therefore, it may contain unintended errors in text, spelling, punctuation, or grammar        Chief Complaint   Patient presents with   • Follow-Up     weight loss - phentermine has lost about 20 lbs in 1 month       Dennis Anderson is a 41 y.o. male here evaluation and management of:     Follow up wt loss doing well!    Current Outpatient Medications   Medication Sig Dispense Refill   • phentermine (ADIPEX-P) 37.5 MG tablet Take 1 Tablet by mouth every morning before breakfast for 14 days. 14 Tablet 0   • phentermine (ADIPEX-P) 37.5 MG tablet Take 1 Tablet by mouth every morning before breakfast for 30 days. 30 Tablet 0   • Naltrexone-buPROPion HCl ER (CONTRAVE) 8-90 MG TABLET SR 12 HR 1 tab daily for 1 week, then 1 tab bid x 1 week then, 2tabs qam and 1 tab pm x 1 week, then 2 tabs bid indefinitely (Patient not taking: Reported on 11/18/2021) 360 Tablet 0     No current facility-administered medications for this visit.     Patient Active Problem List    Diagnosis Date Noted   • Encounter for weight loss counseling 12/03/2018     Past Surgical History:   Procedure Laterality Date   • EYE SURGERY Bilateral     Lasik surgery   • OTHER ORTHOPEDIC SURGERY      Nose reconstructive surgery      Social History     Tobacco Use   • Smoking status: Never Smoker   • Smokeless tobacco: Never Used   Vaping Use   • Vaping Use: Never used   Substance Use Topics   • Alcohol use: No   • Drug use: No     No family history on file.        ROS    all review of system completed and negative except for those listed above     Objective:     /84 (BP Location: Left arm, Patient Position: Sitting, BP Cuff Size: Adult)   Pulse 80   Temp 36.7 °C (98.1 °F) (Temporal)   Resp 16   Ht 1.803 m (5' 11\")   Wt 125 kg (275 lb 9.2 oz)   SpO2 93%  Body mass index is 38.43 kg/m².  Physical Exam:        GEN: " comfortable, alert and oriented, well nourished, well developed, in no apparent distress   HEENT: NCAT, eyes: pupils equal and reactive, sclera white, EOMIT, good dentition  HEART: limbs warm and well perfused, regular rate, no JVD, no lower extremity edema  LUNGS: speaking in full sentences, not in apparent respiratory distress, no audible wheezes  MSK: normal tone and bulk, no swelling of the joints, gait steady and normal         Assessment and Plan:   The following treatment plan was discussed        Problem List Items Addressed This Visit     Encounter for weight loss counseling     In the past he has had successful wt loss with phentermine and did not struggle with elevated blood pressure   We transitioned him to qsymia for safer long term option and he had success with this as well   He unfortunately was lost to follow up as he has a hard time getting time off work   He now plans to utilize telemed     He will invest in a scale for weekly weights      He has lost nearly 20 lbs with phentermine and portion control!   We limit short release stimulant to 6 weeks as this is not safe generally for long term use   Plan to transition to contrave. He is interested in contrave as he disliked the carbonation taste change with qsymia   He will let us know if he chooses cupon or mail order for contrave    30+ min spent              Relevant Medications    phentermine (ADIPEX-P) 37.5 MG tablet                Instructed to follow up if symptoms worsen or fail to improve, ER/UC precautions discussed as well    Elvia Moreno MD  [facility], Family Medicine   [address] [place] [address] [address]ky   [place] [address]  Phone: [phone]

## 2021-12-06 ENCOUNTER — PATIENT MESSAGE (OUTPATIENT)
Dept: MEDICAL GROUP | Facility: MEDICAL CENTER | Age: 41
End: 2021-12-06

## 2021-12-06 DIAGNOSIS — Z71.3 ENCOUNTER FOR WEIGHT LOSS COUNSELING: ICD-10-CM

## 2021-12-06 NOTE — TELEPHONE ENCOUNTER
From: Dennis Anderson  To: Physician Elvia Moreno  Sent: 12/6/2021 10:58 AM PST  Subject: Contrave    Good Morning. I have setup an account with [facility] as requested. I believe they are just waiting to receive my prescription.

## 2021-12-06 NOTE — PATIENT COMMUNICATION
Received request via: Patient    Was the patient seen in the last year in this department? Yes    Does the patient have an active prescription (recently filled or refills available) for medication(s) requested? No       Pt has made account with mail order pharmacy. Can you send script to [facility]? Has been pended

## 2021-12-07 RX ORDER — NALTREXONE HYDROCHLORIDE AND BUPROPION HYDROCHLORIDE 8; 90 MG/1; MG/1
TABLET, EXTENDED RELEASE ORAL
Qty: 360 TABLET | Refills: 0 | Status: SHIPPED | OUTPATIENT
Start: 2021-12-07 | End: 2022-01-13 | Stop reason: SDUPTHER

## 2021-12-16 ENCOUNTER — TELEMEDICINE (OUTPATIENT)
Dept: MEDICAL GROUP | Facility: MEDICAL CENTER | Age: 41
End: 2021-12-16
Payer: COMMERCIAL

## 2021-12-16 VITALS — TEMPERATURE: 97.8 F | HEIGHT: 71 IN | WEIGHT: 266 LBS | BODY MASS INDEX: 37.24 KG/M2

## 2021-12-16 DIAGNOSIS — Z71.3 ENCOUNTER FOR WEIGHT LOSS COUNSELING: ICD-10-CM

## 2021-12-16 PROCEDURE — 99213 OFFICE O/P EST LOW 20 MIN: CPT | Mod: 95 | Performed by: FAMILY MEDICINE

## 2021-12-16 NOTE — PROGRESS NOTES
"This medical record contains text that has been entered with the assistance of computer voice recognition and dictation software.  Therefore, it may contain unintended errors in text, spelling, punctuation, or grammar        Chief Complaint   Patient presents with   • Follow-Up     weight lost, pt started taking contrave on tuesday       Dennis Anderson is a 41 y.o. male here evaluation and management of:  Follow up wt loss   Lost another 10 Lbs               Current Outpatient Medications   Medication Sig Dispense Refill   • Naltrexone-buPROPion HCl ER (CONTRAVE) 8-90 MG TABLET SR 12 HR 1 tab daily for 1 week, then 1 tab bid x 1 week then, 2tabs qam and 1 tab pm x 1 week, then 2 tabs bid indefinitely 360 Tablet 0     No current facility-administered medications for this visit.     Patient Active Problem List    Diagnosis Date Noted   • Encounter for weight loss counseling 12/03/2018     Past Surgical History:   Procedure Laterality Date   • EYE SURGERY Bilateral     Lasik surgery   • OTHER ORTHOPEDIC SURGERY      Nose reconstructive surgery      Social History     Tobacco Use   • Smoking status: Never Smoker   • Smokeless tobacco: Never Used   Vaping Use   • Vaping Use: Never used   Substance Use Topics   • Alcohol use: No   • Drug use: No     No family history on file.        ROS    all review of system completed and negative except for those listed above     Objective:     Temp 36.6 °C (97.8 °F) (Temporal)   Ht 1.803 m (5' 11\")   Wt 121 kg (266 lb)  Body mass index is 37.1 kg/m².  Physical Exam:        GEN: comfortable, alert and oriented, well nourished, well developed, in no apparent distress   HEENT: NCAT, eyes: pupils equal and reactive, sclera white, EOMIT, good dentition  HEART: limbs warm and well perfused, regular rate, no JVD, no lower extremity edema  LUNGS: speaking in full sentences, not in apparent respiratory distress, no audible wheezes  MSK: normal tone and bulk, no swelling of the joints, gait " steady and normal         Assessment and Plan:   The following treatment plan was discussed        Problem List Items Addressed This Visit     Encounter for weight loss counseling     He has lost nearly 10% since his start      Which is our goal in the medically supervised weight loss world as we see a significant reduction in co morbidities which contribute to cardiovascular risk at this point.      He has transitioned from phentermine (for jump start /short term use only) to contrave   Did qsymia in the past but did not like side effect     20+ min spent     Follow up 1 mo                          Instructed to follow up if symptoms worsen or fail to improve, ER/UC precautions discussed as well    Elvia Moreno MD  [facility], Family Medicine   [address] [place] [address] [address]   [place] [address]  Phone: [phone]

## 2022-01-13 ENCOUNTER — TELEMEDICINE (OUTPATIENT)
Dept: MEDICAL GROUP | Facility: MEDICAL CENTER | Age: 42
End: 2022-01-13
Payer: COMMERCIAL

## 2022-01-13 VITALS — WEIGHT: 259 LBS | TEMPERATURE: 98 F | BODY MASS INDEX: 36.26 KG/M2 | HEIGHT: 71 IN

## 2022-01-13 DIAGNOSIS — Z71.3 ENCOUNTER FOR WEIGHT LOSS COUNSELING: ICD-10-CM

## 2022-01-13 PROCEDURE — 99213 OFFICE O/P EST LOW 20 MIN: CPT | Mod: 95 | Performed by: FAMILY MEDICINE

## 2022-01-13 RX ORDER — NALTREXONE HYDROCHLORIDE AND BUPROPION HYDROCHLORIDE 8; 90 MG/1; MG/1
TABLET, EXTENDED RELEASE ORAL
Qty: 360 TABLET | Refills: 0 | Status: SHIPPED | OUTPATIENT
Start: 2022-01-13 | End: 2022-03-01

## 2022-01-13 NOTE — ASSESSMENT & PLAN NOTE
Lost another 7 lbs since our last visit   Doing his own meal prep   Turkey chili recipe   sand which salad   Does weights at home   We discuss cardio exercise goals for wt loss maintenance  Continue contrave  - he has noticed benefit for portion control with this medicine   20+min spent

## 2022-01-13 NOTE — PROGRESS NOTES
"  Telemedicine: Established Patient   This evaluation was conducted via Zoom using secure and encrypted videoconferencing technology. The patient was in a private location in the state [facility].    The patient's identity was confirmed and verbal consent was obtained for this virtual visit.    Subjective:   CC:   Chief Complaint   Patient presents with   • Follow-Up     weight loss, contrave is helping, has lost 7 lbs since last time       Dennis Anderson is a 41 y.o. male presenting for evaluation and management of:      Follow up wt loss       Current medicines (including changes today)  Current Outpatient Medications   Medication Sig Dispense Refill   • Naltrexone-buPROPion HCl ER (CONTRAVE) 8-90 MG TABLET SR 12 HR 1 tab daily for 1 week, then 1 tab bid x 1 week then, 2tabs qam and 1 tab pm x 1 week, then 2 tabs bid indefinitely 360 Tablet 0     No current facility-administered medications for this visit.       Patient Active Problem List    Diagnosis Date Noted   • Encounter for weight loss counseling 12/03/2018       No family history on file.    He  has no past medical history on file.  He  has a past surgical history that includes other orthopedic surgery and eye surgery (Bilateral).       Objective:   Temp 36.7 °C (98 °F) (Temporal) Comment: pt stated  Ht 1.803 m (5' 11\") Comment: pt stated  Wt 117 kg (259 lb) Comment: pt stated  BMI 36.12 kg/m²     Physical Exam    Vitals obtained by patient:    Physical Exam:  Constitutional: Alert, no distress, well-groomed.  Skin: No rashes in visible areas.  Eye: Round. Conjunctiva clear, lids normal. No icterus.   ENMT: Lips pink without lesions, good dentition, moist mucous membranes. Phonation normal.  Neck: No masses, no thyromegaly. Moves freely without pain.  Respiratory: Unlabored respiratory effort, no cough or audible wheeze  Psych: Alert and oriented x3, normal affect and mood.       Assessment and Plan:   The following treatment plan was discussed:     1. " Encounter for weight loss counseling  - Naltrexone-buPROPion HCl ER (CONTRAVE) 8-90 MG TABLET SR 12 HR; 1 tab daily for 1 week, then 1 tab bid x 1 week then, 2tabs qam and 1 tab pm x 1 week, then 2 tabs bid indefinitely  Dispense: 360 Tablet; Refill: 0    Problem List Items Addressed This Visit     Encounter for weight loss counseling     Lost another 7 lbs since our last visit   Doing his own meal prep   Turkey chili recipe   sand which salad   Does weights at home   We discuss cardio exercise goals for wt loss maintenance  Continue contrave  - he has noticed benefit for portion control with this medicine   20+min spent               Relevant Medications    Naltrexone-buPROPion HCl ER (CONTRAVE) 8-90 MG TABLET SR 12 HR            Follow-up: No follow-ups on file.

## 2022-02-28 DIAGNOSIS — Z71.3 ENCOUNTER FOR WEIGHT LOSS COUNSELING: ICD-10-CM

## 2022-03-01 RX ORDER — NALTREXONE HYDROCHLORIDE AND BUPROPION HYDROCHLORIDE 8; 90 MG/1; MG/1
TABLET, EXTENDED RELEASE ORAL
Qty: 360 TABLET | Refills: 0 | Status: SHIPPED | OUTPATIENT
Start: 2022-03-01 | End: 2023-07-25

## 2022-05-25 DIAGNOSIS — Z71.3 ENCOUNTER FOR WEIGHT LOSS COUNSELING: ICD-10-CM

## 2022-05-26 RX ORDER — NALTREXONE HYDROCHLORIDE AND BUPROPION HYDROCHLORIDE 8; 90 MG/1; MG/1
TABLET, EXTENDED RELEASE ORAL
Qty: 360 TABLET | Refills: 0 | OUTPATIENT
Start: 2022-05-26

## 2023-03-22 NOTE — ASSESSMENT & PLAN NOTE
He has lost nearly 10% since his start      Which is our goal in the medically supervised weight loss world as we see a significant reduction in co morbidities which contribute to cardiovascular risk at this point.      He has transitioned from phentermine (for jump start /short term use only) to contrave   Did qsymia in the past but did not like side effect     20+ min spent     Follow up 1 mo       
No

## 2023-05-02 ENCOUNTER — HOSPITAL ENCOUNTER (OUTPATIENT)
Dept: RADIOLOGY | Facility: MEDICAL CENTER | Age: 43
End: 2023-05-02
Attending: FAMILY MEDICINE
Payer: COMMERCIAL

## 2023-05-02 ENCOUNTER — HOSPITAL ENCOUNTER (OUTPATIENT)
Facility: MEDICAL CENTER | Age: 43
End: 2023-05-02
Attending: FAMILY MEDICINE
Payer: COMMERCIAL

## 2023-05-02 ENCOUNTER — OFFICE VISIT (OUTPATIENT)
Dept: URGENT CARE | Facility: PHYSICIAN GROUP | Age: 43
End: 2023-05-02
Payer: COMMERCIAL

## 2023-05-02 VITALS
SYSTOLIC BLOOD PRESSURE: 130 MMHG | HEIGHT: 71 IN | HEART RATE: 65 BPM | TEMPERATURE: 97.8 F | RESPIRATION RATE: 18 BRPM | BODY MASS INDEX: 41.72 KG/M2 | DIASTOLIC BLOOD PRESSURE: 70 MMHG | OXYGEN SATURATION: 99 % | WEIGHT: 298 LBS

## 2023-05-02 DIAGNOSIS — R31.9 HEMATURIA, UNSPECIFIED TYPE: ICD-10-CM

## 2023-05-02 DIAGNOSIS — N50.3 EPIDIDYMAL CYST: ICD-10-CM

## 2023-05-02 DIAGNOSIS — R10.32 LEFT INGUINAL PAIN: ICD-10-CM

## 2023-05-02 LAB
APPEARANCE UR: CLEAR
BILIRUB UR STRIP-MCNC: NORMAL MG/DL
COLOR UR AUTO: NORMAL
GLUCOSE UR STRIP.AUTO-MCNC: NORMAL MG/DL
KETONES UR STRIP.AUTO-MCNC: NORMAL MG/DL
LEUKOCYTE ESTERASE UR QL STRIP.AUTO: NORMAL
NITRITE UR QL STRIP.AUTO: NORMAL
PH UR STRIP.AUTO: 7 [PH] (ref 5–8)
PROT UR QL STRIP: NORMAL MG/DL
RBC UR QL AUTO: NORMAL
SP GR UR STRIP.AUTO: 1.02
UROBILINOGEN UR STRIP-MCNC: 0.2 MG/DL

## 2023-05-02 PROCEDURE — 87086 URINE CULTURE/COLONY COUNT: CPT

## 2023-05-02 PROCEDURE — 81002 URINALYSIS NONAUTO W/O SCOPE: CPT | Performed by: FAMILY MEDICINE

## 2023-05-02 PROCEDURE — 99215 OFFICE O/P EST HI 40 MIN: CPT | Performed by: FAMILY MEDICINE

## 2023-05-02 PROCEDURE — 74176 CT ABD & PELVIS W/O CONTRAST: CPT

## 2023-05-02 NOTE — PROGRESS NOTES
"`   Subjective:     Chief Complaint   Patient presents with    Abdominal Pain     X 3 days pain in groin, pain behind testicles, abdominal pain    Groin Pain                 groin Pain  This is a new problem. The current episode started 3 d ago.  The problem occurs constantly. The pain is unchanged. The pain is located in the LEFT groin region. The pain is mild. The quality of the pain is described as aching. The pain does not radiate.  Pertinent negatives include no  constipation, diarrhea, dysuria, fever, hematochezia, hematuria, nausea or sore throat. Nothing relieves the symptoms. Past treatments include nothing.     Social History     Tobacco Use    Smoking status: Never    Smokeless tobacco: Never   Vaping Use    Vaping Use: Never used   Substance Use Topics    Alcohol use: No    Drug use: No           Current Outpatient Medications on File Prior to Visit   Medication Sig Dispense Refill    Naltrexone-buPROPion HCl ER (CONTRAVE) 8-90 MG TABLET SR 12 HR TAKE 1 TABLET BY MOUTH DAILY FOR 7 DAYS THEN 1 TWICE A DAY FOR 7 DAYS, THEN 2 IN THE AM AND 1 IN THE PM FOR 7 DAYS THEN TAKE 2 TWICE DAILY THEREAFTER. (Patient not taking: Reported on 5/2/2023) 360 Tablet 0     No current facility-administered medications on file prior to visit.       History reviewed. No pertinent family history.      No Known Allergies      Review of Systems   Constitutional: Negative for fever.   HENT: Negative for sore throat.    Gastrointestinal:   Negative for nausea, diarrhea, constipation and hematochezia.   Genitourinary: Negative for dysuria and hematuria.   Neurological: denies dizziness, confusion, disorientation.   No extremity weakness or numbness  All other systems reviewed and are negative.         Objective:     /70 (BP Location: Left arm, Patient Position: Sitting, BP Cuff Size: Adult)   Pulse 65   Temp 36.6 °C (97.8 °F) (Temporal)   Resp 18   Ht 1.803 m (5' 11\")   Wt (!) 135 kg (298 lb)   SpO2 99%       Physical " Exam   Constitutional: pt appears well-developed. No distress.   HENT:   Nose: No nasal discharge.   Mouth/Throat: Mucous membranes are moist. Oropharynx is clear.   Eyes: Conjunctivae and EOM are normal. Pupils are equal, round, and reactive to light. Right eye exhibits no discharge. Left eye exhibits no discharge.   Neck: Neck supple.   Cardiovascular: Normal rate, regular rhythm, S1 normal and S2 normal.    Pulmonary/Chest: Effort normal and breath sounds normal. There is normal air entry. No respiratory distress.   Abdominal: Soft.  No TTP.  bowel sounds are present.   No liver or spleen enlargement .  No rebound and no guarding.   There is no pain over McBurney's point   :   normal male phallus.   No testicular masses or tenderness.   No scrotal edema.    + reducible left inguinal hernia.   Lymphadenopathy:     Pt has no  adenopathy.   Neurological: pt is alert and orientated x3 . No cranial nerve deficit.   Skin: Skin is warm and moist. No petechiae and no rash noted.   not diaphoretic. No jaundice.   Nursing note and vitals reviewed.              Lab Results   Component Value Date/Time    POCCOLOR Dark Yellow 05/02/2023 10:13 AM    POCAPPEAR Clear 05/02/2023 10:13 AM    POCLEUKEST Neg 05/02/2023 10:13 AM    POCNITRITE Neg 05/02/2023 10:13 AM    POCUROBILIGE 0.2 05/02/2023 10:13 AM    POCPROTEIN Neg 05/02/2023 10:13 AM    POCURPH 7.0 05/02/2023 10:13 AM    POCBLOOD Trace-lysed 05/02/2023 10:13 AM    POCSPGRV 1.025 05/02/2023 10:13 AM    POCKETONES Neg 05/02/2023 10:13 AM    POCBILIRUBIN Neg 05/02/2023 10:13 AM    POCGLUCUA Neg 05/02/2023 10:13 AM      Result Notes  Details    Reading Physician Reading Date Result Priority   Mauro Morales M.D.  [phone] 5/2/2023 Stat-Call Report     Narrative & Impression     5/2/2023 4:54 PM     HISTORY/REASON FOR EXAM:  hematuria.  Hematuria, groin pain     TECHNIQUE/EXAM DESCRIPTION AND NUMBER OF VIEWS:  CT scan renal/colic without contrast.     5 mm helical images of  the abdomen and pelvis were obtained from the diaphragmatic domes through the pubic symphysis.     Low dose optimization technique was utilized for this CT exam including automated exposure control and adjustment of the mA and/or kV according to patient size.     COMPARISON: None.     FINDINGS:  Chest Base: Lung bases are clear.     Liver:  Normal.     Gallbladder: Normal     Biliary tract: Nondilated.     Pancreas: Normal.     Spleen: Normal size. Tiny calcified granulomas.     Adrenals: Normal.     Kidneys and Collecting Systems:  Normal.     Gastrointestinal tract:  No bowel obstruction. The appendix is normal.     Peritoneum: No free air or free fluid.     Reproductive organs:  Normal.     Bladder:  Normal.     Vessels:  Normal caliber.     Lymph  Nodes:  No lymphadenopathy.     Abdominal wall: Small fat-containing umbilical hernia.     Bones:  No acute or aggressive abnormality.     IMPRESSION:        No acute abnormality.     No urolithiasis or hydronephrosis.                    Exam Ended: 05/02/23  4:54 PM Last Resulted: 05/02/23  5:03 PM           Details    Reading Physician Reading Date Result Priority   Viral Calzada M.D.  [phone] 5/4/2023 Stat-Call Report     Narrative & Impression     5/2/2023 4:42 PM     HISTORY/REASON FOR EXAM:  Pain  Left inguinal pain     TECHNIQUE/EXAM DESCRIPTION AND NUMBER OF VIEWS:  Left inguinal ultrasound with and without Valsalva maneuver.     COMPARISON: CT scan May 2, 2023     FINDINGS:     There is a fat-containing reducible left inguinal hernia. The neck measures 2.2 cm     There is no mass or fluid collection.        IMPRESSION:        1. Fat-containing reducible left inguinal hernia.              Exam Ended: 05/04/23  6:41 PM Last Resulted: 05/04/23  7:14 PM           Details    Reading Physician Reading Date Result Priority   Viral Calzada M.D.  [phone] 5/4/2023 Stat-Call Report     Narrative & Impression     5/2/2023 4:42 PM     HISTORY/REASON FOR  EXAM: Swelling. Left scrotal pain     TECHNIQUE/EXAM DESCRIPTION:  Real-time sonography of the scrotum was performed with gray-scale, color and duplex Doppler imaging.     COMPARISON: None     FINDINGS:     The right testis measures 4 4 1 x 3.7 x 2.2 cm. Normal in size and echotexture. Normal vascularity on color Doppler. No intratesticular mass.     The left testis measures 3.9 x 2.6 x 2.2 cm. Normal in size and echotexture. Normal vascularity on color Doppler. No intratesticular mass.     Vascular flow is symmetric.     There is a 17 mm left epidermal head cyst.     No abnormal volume hydrocele is detected.     No varicocele is detected.     IMPRESSION:     1.  17 mm left epididymal head cyst.     2.  Otherwise unremarkable scrotal ultrasound.           Exam Ended: 05/04/23  6:41 PM Last Resulted: 05/04/23  7:16 PM           Assessment/Plan:             1. Left inguinal pain    UA was unremarkable.   Urine sent for culture.        U/s personally reviewed.   There is a reducible left inguinal hernia present    Referred to general surgery for repair.             2. Hematuria, unspecified type  CT personally reviewed.     No kidney stones.       Advised f/u with PCP             3. Epididymal cyst  Seen incidentally on U/s    Likely benign    Advised f/u PCP        My total time spent caring for the patient on the day of the encounter was 40 minutes.   This does not include time spent on separately billable procedures/tests.

## 2023-05-03 DIAGNOSIS — R10.32 LEFT INGUINAL PAIN: ICD-10-CM

## 2023-05-04 ENCOUNTER — HOSPITAL ENCOUNTER (OUTPATIENT)
Dept: RADIOLOGY | Facility: MEDICAL CENTER | Age: 43
End: 2023-05-04
Attending: FAMILY MEDICINE
Payer: COMMERCIAL

## 2023-05-04 PROCEDURE — 76870 US EXAM SCROTUM: CPT

## 2023-05-04 PROCEDURE — 76857 US EXAM PELVIC LIMITED: CPT

## 2023-05-05 DIAGNOSIS — K40.90 NON-RECURRENT UNILATERAL INGUINAL HERNIA WITHOUT OBSTRUCTION OR GANGRENE: ICD-10-CM

## 2023-05-05 LAB
BACTERIA UR CULT: NORMAL
SIGNIFICANT IND 70042: NORMAL
SITE SITE: NORMAL
SOURCE SOURCE: NORMAL

## 2023-05-10 ENCOUNTER — OFFICE VISIT (OUTPATIENT)
Dept: MEDICAL GROUP | Facility: MEDICAL CENTER | Age: 43
End: 2023-05-10
Payer: COMMERCIAL

## 2023-05-10 VITALS
WEIGHT: 298 LBS | TEMPERATURE: 98.2 F | RESPIRATION RATE: 16 BRPM | HEART RATE: 86 BPM | DIASTOLIC BLOOD PRESSURE: 90 MMHG | OXYGEN SATURATION: 93 % | SYSTOLIC BLOOD PRESSURE: 148 MMHG | HEIGHT: 71 IN | BODY MASS INDEX: 41.72 KG/M2

## 2023-05-10 DIAGNOSIS — N50.3 EPIDIDYMAL CYST: ICD-10-CM

## 2023-05-10 DIAGNOSIS — K40.90 NON-RECURRENT UNILATERAL INGUINAL HERNIA WITHOUT OBSTRUCTION OR GANGRENE: ICD-10-CM

## 2023-05-10 PROBLEM — N50.82 SCROTAL PAIN: Status: ACTIVE | Noted: 2023-05-10

## 2023-05-10 PROCEDURE — 99213 OFFICE O/P EST LOW 20 MIN: CPT | Performed by: BEHAVIOR ANALYST

## 2023-05-10 ASSESSMENT — PATIENT HEALTH QUESTIONNAIRE - PHQ9: CLINICAL INTERPRETATION OF PHQ2 SCORE: 0

## 2023-05-10 NOTE — PROGRESS NOTES
"Subjective:     CC:    Chief Complaint   Patient presents with    Referral Needed     Surgeon Hernia       PCP: Dr. Shania Moreno    HISTORY OF THE PRESENT ILLNESS:   Dennis presents today with    Problem   Non-Recurrent Unilateral Inguinal Hernia Without Obstruction Or Gangrene    Onset: started about two weeks ago, at a friends house and sudden soreness of his left hip. The next day he felt a worsening stabbing sensation in his left Hip and then the pain moved to the scrotal area and pelvis that night.  He proceeded to urgent care where he was ordered a UA which showed trace-lysed blood, otherwise unremarkable.  He was ordered a   CT renal colic-within normal limits and without kidney stone, Scrotal ultrasound-finding of 17 mm epididymal cyst.   inguinal hernia ultrasound-see below    There is a fat-containing reducible left inguinal hernia. The neck measures 2.2 cm      Currently reports 3 out of 10 pain in the pelvic area.  He admits that he \"did something stupid yesterday\" which aggravated the pain some.  He denies trouble with urination or bowel movements.  He was referred to [facility] by  provider for hernia repair       Epididymal Cyst    Ultrasound of scrotum ordered by urgent care provider due to pelvic pain.  US inguinal hernia results significant for fat-containing hernia.     Scrotal ultrasound 5/4/2023:   IMPRESSION:     1.  17 mm left epididymal head cyst.  2.  Otherwise unremarkable ultrasound    Patient denies pain in testicle. Denies swelling or asymmetry of testicle.           Current Outpatient Medications   Medication Sig    Naltrexone-buPROPion HCl ER (CONTRAVE) 8-90 MG TABLET SR 12 HR TAKE 1 TABLET BY MOUTH DAILY FOR 7 DAYS THEN 1 TWICE A DAY FOR 7 DAYS, THEN 2 IN THE AM AND 1 IN THE PM FOR 7 DAYS THEN TAKE 2 TWICE DAILY THEREAFTER. (Patient not taking: Reported on 5/2/2023)          ROS: See HPI        Objective:     Exam: BP (!) 148/90 (BP Location: Left arm, Patient Position: " "Sitting, BP Cuff Size: Adult long)   Pulse 86   Temp 36.8 °C (98.2 °F) (Temporal)   Resp 16   Ht 1.803 m (5' 11\")   Wt (!) 135 kg (298 lb)   SpO2 93%   BMI 41.56 kg/m²   Body mass index is 41.56 kg/m².    Physical Exam  Constitutional:       Appearance: Normal appearance.   HENT:      Head: Normocephalic and atraumatic.   Cardiovascular:      Pulses: Normal pulses.   Pulmonary:      Effort: Pulmonary effort is normal.   Genitourinary:     Comments: Pelvic exam deferred given clear results on imaging and pain and discomfort are improving..  Musculoskeletal:      Cervical back: Normal range of motion.   Neurological:      General: No focal deficit present.      Mental Status: He is alert.                Assessment & Plan:     43 y.o. male with the following -     1. Non-recurrent unilateral inguinal hernia without obstruction or gangrene  -Subacute problem.  Fat-containing, reducible left inguinal hernia confirmed by ultrasound.  -Educated patient regarding avoiding lifting greater than 10 pounds.  Recommended healthy diet and weight loss.  -He has been referred to [facility] for surgical intervention of the hernia.  Provided patient with contact information regarding his referral.    2. Epididymal cyst  -New problem.  Incidental finding on scrotal ultrasound- 17mm.  Patient denies any scrotal pain or enlargement. Likely Benign.   -Per up-to-date recommendations reviewed with patient today, epididymal cysts do not require surgical intervention unless greater than 2 cm and causing pain.  -If having pain in the future or continued discomfort after hernia repair we will refer to urology.        Return for As scheduled with PCP Dr. Moreno.    Please note that this dictation was created using voice recognition software. I have made every reasonable attempt to correct obvious errors, but I expect that there are errors of grammar and possibly content that I did not discover before finalizing the note.  "

## 2023-06-16 ENCOUNTER — OFFICE VISIT (OUTPATIENT)
Dept: MEDICAL GROUP | Facility: MEDICAL CENTER | Age: 43
End: 2023-06-16
Payer: COMMERCIAL

## 2023-06-16 VITALS
HEIGHT: 71 IN | TEMPERATURE: 98.6 F | DIASTOLIC BLOOD PRESSURE: 78 MMHG | BODY MASS INDEX: 41.79 KG/M2 | SYSTOLIC BLOOD PRESSURE: 124 MMHG | OXYGEN SATURATION: 93 % | WEIGHT: 298.5 LBS | RESPIRATION RATE: 20 BRPM | HEART RATE: 83 BPM

## 2023-06-16 DIAGNOSIS — G56.21 ULNAR NEUROPATHY OF RIGHT UPPER EXTREMITY: ICD-10-CM

## 2023-06-16 DIAGNOSIS — K40.90 NON-RECURRENT UNILATERAL INGUINAL HERNIA WITHOUT OBSTRUCTION OR GANGRENE: ICD-10-CM

## 2023-06-16 PROCEDURE — 99214 OFFICE O/P EST MOD 30 MIN: CPT | Performed by: FAMILY MEDICINE

## 2023-06-16 PROCEDURE — 3078F DIAST BP <80 MM HG: CPT | Performed by: FAMILY MEDICINE

## 2023-06-16 PROCEDURE — 3074F SYST BP LT 130 MM HG: CPT | Performed by: FAMILY MEDICINE

## 2023-06-16 NOTE — PROGRESS NOTES
"This medical record contains text that has been entered with the assistance of computer voice recognition and dictation software.  Therefore, it may contain unintended errors in text, spelling, punctuation, or grammar        Chief Complaint   Patient presents with    Inguinal Hernia     Poss bilateral inguinal hernia; Pt. Is scheduled for surgery x 1 mo.    Numbness     Rt. Arm feels numb, started on his Rt. Pinky now for 2 wks       Dennis Anderson is a 43 y.o. male here evaluation and management of:       Current Outpatient Medications   Medication Sig Dispense Refill    Naltrexone-buPROPion HCl ER (CONTRAVE) 8-90 MG TABLET SR 12 HR TAKE 1 TABLET BY MOUTH DAILY FOR 7 DAYS THEN 1 TWICE A DAY FOR 7 DAYS, THEN 2 IN THE AM AND 1 IN THE PM FOR 7 DAYS THEN TAKE 2 TWICE DAILY THEREAFTER. (Patient not taking: Reported on 5/2/2023) 360 Tablet 0     No current facility-administered medications for this visit.     Patient Active Problem List    Diagnosis Date Noted    Ulnar neuropathy of right upper extremity 06/16/2023    Non-recurrent unilateral inguinal hernia without obstruction or gangrene 05/10/2023    Epididymal cyst 05/10/2023    Encounter for weight loss counseling 12/03/2018     Past Surgical History:   Procedure Laterality Date    EYE SURGERY Bilateral     Lasik surgery    OTHER ORTHOPEDIC SURGERY      Nose reconstructive surgery      Social History     Tobacco Use    Smoking status: Never    Smokeless tobacco: Never   Vaping Use    Vaping Use: Never used   Substance Use Topics    Alcohol use: No    Drug use: No     No family history on file.        ROS    all review of system completed and negative except for those listed above     Objective:     /78 (BP Location: Left arm, Patient Position: Standing, BP Cuff Size: Adult long)   Pulse 83   Temp 37 °C (98.6 °F) (Temporal)   Resp 20   Ht 1.803 m (5' 11\")   Wt (!) 135 kg (298 lb 8.1 oz)   SpO2 93%  Body mass index is 41.63 kg/m².  Physical " Exam:      GEN: comfortable, alert and oriented, well nourished, well developed, in no apparent distress   HEENT: NCAT, eyes: pupils equal and reactive, sclera white, EOMIT, good dentition  HEART: limbs warm and well perfused, regular rate, no JVD, no lower extremity edema  LUNGS: speaking in full sentences, not in apparent respiratory distress, no audible wheezes  MSK: normal tone and bulk, no swelling of the joints, gait steady and normal         Assessment and Plan:   The following treatment plan was discussed        Problem List Items Addressed This Visit       Non-recurrent unilateral inguinal hernia without obstruction or gangrene     Has surgery scheduled in a month   We take extra time to review surgery and post op expectations   I believe he will be very glad to do this surgery              Ulnar neuropathy of right upper extremity     New problem uncertain prognosis   Nerve pain sharp shooting 4th and 5th finger now radiating to elbow   I explain this is ulnar neuropathy and likely triggered by resting elbow on his desk   Cure is correcting the underying aggravator   We look at elbow pads for him to buy   I also encourage him to consult with MSK     30+ min spent          Relevant Orders    Referral to Sports Medicine    DX-ELBOW-COMPLETE 3+ RIGHT             Instructed to follow up if symptoms worsen or fail to improve, ER/UC precautions discussed as well    Elvia Moreno MD  [facility], Family Medicine   [address] [place] [address] [address]   [place] [address]  Phone: [phone]

## 2023-07-13 ENCOUNTER — TELEPHONE (OUTPATIENT)
Dept: HEALTH INFORMATION MANAGEMENT | Facility: OTHER | Age: 43
End: 2023-07-13
Payer: COMMERCIAL

## 2023-07-25 ENCOUNTER — APPOINTMENT (OUTPATIENT)
Dept: ADMISSIONS | Facility: MEDICAL CENTER | Age: 43
End: 2023-07-25
Attending: SURGERY
Payer: COMMERCIAL

## 2023-07-27 ENCOUNTER — HOSPITAL ENCOUNTER (OUTPATIENT)
Facility: MEDICAL CENTER | Age: 43
End: 2023-07-27
Attending: SURGERY | Admitting: SURGERY
Payer: COMMERCIAL

## 2023-07-27 ENCOUNTER — ANESTHESIA (OUTPATIENT)
Dept: SURGERY | Facility: MEDICAL CENTER | Age: 43
End: 2023-07-27
Payer: COMMERCIAL

## 2023-07-27 ENCOUNTER — ANESTHESIA EVENT (OUTPATIENT)
Dept: SURGERY | Facility: MEDICAL CENTER | Age: 43
End: 2023-07-27
Payer: COMMERCIAL

## 2023-07-27 VITALS
TEMPERATURE: 96.2 F | HEIGHT: 68 IN | HEART RATE: 71 BPM | WEIGHT: 178.35 LBS | BODY MASS INDEX: 27.03 KG/M2 | DIASTOLIC BLOOD PRESSURE: 81 MMHG | SYSTOLIC BLOOD PRESSURE: 150 MMHG | RESPIRATION RATE: 16 BRPM | OXYGEN SATURATION: 90 %

## 2023-07-27 DIAGNOSIS — G89.18 POSTOPERATIVE PAIN: ICD-10-CM

## 2023-07-27 PROCEDURE — 700111 HCHG RX REV CODE 636 W/ 250 OVERRIDE (IP): Performed by: ANESTHESIOLOGY

## 2023-07-27 PROCEDURE — 160035 HCHG PACU - 1ST 60 MINS PHASE I: Performed by: SURGERY

## 2023-07-27 PROCEDURE — A9270 NON-COVERED ITEM OR SERVICE: HCPCS | Performed by: ANESTHESIOLOGY

## 2023-07-27 PROCEDURE — 502714 HCHG ROBOTIC SURGERY SERVICES: Performed by: SURGERY

## 2023-07-27 PROCEDURE — 160046 HCHG PACU - 1ST 60 MINS PHASE II: Performed by: SURGERY

## 2023-07-27 PROCEDURE — 160009 HCHG ANES TIME/MIN: Performed by: SURGERY

## 2023-07-27 PROCEDURE — 700102 HCHG RX REV CODE 250 W/ 637 OVERRIDE(OP): Performed by: ANESTHESIOLOGY

## 2023-07-27 PROCEDURE — C1781 MESH (IMPLANTABLE): HCPCS | Performed by: SURGERY

## 2023-07-27 PROCEDURE — 700101 HCHG RX REV CODE 250: Performed by: SURGERY

## 2023-07-27 PROCEDURE — 160048 HCHG OR STATISTICAL LEVEL 1-5: Performed by: SURGERY

## 2023-07-27 PROCEDURE — 160041 HCHG SURGERY MINUTES - EA ADDL 1 MIN LEVEL 4: Performed by: SURGERY

## 2023-07-27 PROCEDURE — 160029 HCHG SURGERY MINUTES - 1ST 30 MINS LEVEL 4: Performed by: SURGERY

## 2023-07-27 PROCEDURE — 160025 RECOVERY II MINUTES (STATS): Performed by: SURGERY

## 2023-07-27 PROCEDURE — 00840 ANES IPER PX LOWER ABD NOS: CPT | Performed by: ANESTHESIOLOGY

## 2023-07-27 PROCEDURE — 700105 HCHG RX REV CODE 258: Mod: JZ | Performed by: ANESTHESIOLOGY

## 2023-07-27 PROCEDURE — 160002 HCHG RECOVERY MINUTES (STAT): Performed by: SURGERY

## 2023-07-27 PROCEDURE — 700101 HCHG RX REV CODE 250: Performed by: ANESTHESIOLOGY

## 2023-07-27 DEVICE — MESH PROGRIP LAPROSCOPIC SELF FIXATING (1/CA): Type: IMPLANTABLE DEVICE | Site: GROIN | Status: FUNCTIONAL

## 2023-07-27 RX ORDER — MEPERIDINE HYDROCHLORIDE 25 MG/ML
12.5 INJECTION INTRAMUSCULAR; INTRAVENOUS; SUBCUTANEOUS
Status: DISCONTINUED | OUTPATIENT
Start: 2023-07-27 | End: 2023-07-27 | Stop reason: HOSPADM

## 2023-07-27 RX ORDER — HYDROMORPHONE HYDROCHLORIDE 1 MG/ML
0.4 INJECTION, SOLUTION INTRAMUSCULAR; INTRAVENOUS; SUBCUTANEOUS
Status: DISCONTINUED | OUTPATIENT
Start: 2023-07-27 | End: 2023-07-27 | Stop reason: HOSPADM

## 2023-07-27 RX ORDER — HYDROMORPHONE HYDROCHLORIDE 1 MG/ML
1 INJECTION, SOLUTION INTRAMUSCULAR; INTRAVENOUS; SUBCUTANEOUS
Status: DISCONTINUED | OUTPATIENT
Start: 2023-07-27 | End: 2023-07-27 | Stop reason: HOSPADM

## 2023-07-27 RX ORDER — IPRATROPIUM BROMIDE AND ALBUTEROL SULFATE 2.5; .5 MG/3ML; MG/3ML
3 SOLUTION RESPIRATORY (INHALATION)
Status: DISCONTINUED | OUTPATIENT
Start: 2023-07-27 | End: 2023-07-27 | Stop reason: HOSPADM

## 2023-07-27 RX ORDER — BUPIVACAINE HYDROCHLORIDE AND EPINEPHRINE 5; 5 MG/ML; UG/ML
INJECTION, SOLUTION PERINEURAL
Status: DISCONTINUED | OUTPATIENT
Start: 2023-07-27 | End: 2023-07-27 | Stop reason: HOSPADM

## 2023-07-27 RX ORDER — POLYETHYLENE GLYCOL 3350 17 G/17G
17 POWDER, FOR SOLUTION ORAL DAILY
Qty: 20 EACH | Refills: 0 | Status: SHIPPED | OUTPATIENT
Start: 2023-07-27

## 2023-07-27 RX ORDER — SODIUM CHLORIDE, SODIUM LACTATE, POTASSIUM CHLORIDE, CALCIUM CHLORIDE 600; 310; 30; 20 MG/100ML; MG/100ML; MG/100ML; MG/100ML
INJECTION, SOLUTION INTRAVENOUS
Status: DISCONTINUED | OUTPATIENT
Start: 2023-07-27 | End: 2023-07-27 | Stop reason: SURG

## 2023-07-27 RX ORDER — DEXAMETHASONE SODIUM PHOSPHATE 4 MG/ML
INJECTION, SOLUTION INTRA-ARTICULAR; INTRALESIONAL; INTRAMUSCULAR; INTRAVENOUS; SOFT TISSUE PRN
Status: DISCONTINUED | OUTPATIENT
Start: 2023-07-27 | End: 2023-07-27 | Stop reason: SURG

## 2023-07-27 RX ORDER — IBUPROFEN 600 MG/1
600 TABLET ORAL EVERY 6 HOURS
Qty: 45 TABLET | Refills: 0 | Status: SHIPPED | OUTPATIENT
Start: 2023-07-27

## 2023-07-27 RX ORDER — MIDAZOLAM HYDROCHLORIDE 1 MG/ML
1 INJECTION INTRAMUSCULAR; INTRAVENOUS
Status: DISCONTINUED | OUTPATIENT
Start: 2023-07-27 | End: 2023-07-27 | Stop reason: HOSPADM

## 2023-07-27 RX ORDER — ONDANSETRON 2 MG/ML
INJECTION INTRAMUSCULAR; INTRAVENOUS PRN
Status: DISCONTINUED | OUTPATIENT
Start: 2023-07-27 | End: 2023-07-27 | Stop reason: SURG

## 2023-07-27 RX ORDER — OXYCODONE HCL 5 MG/5 ML
10 SOLUTION, ORAL ORAL
Status: COMPLETED | OUTPATIENT
Start: 2023-07-27 | End: 2023-07-27

## 2023-07-27 RX ORDER — ONDANSETRON 2 MG/ML
4 INJECTION INTRAMUSCULAR; INTRAVENOUS
Status: DISCONTINUED | OUTPATIENT
Start: 2023-07-27 | End: 2023-07-27 | Stop reason: HOSPADM

## 2023-07-27 RX ORDER — DIPHENHYDRAMINE HYDROCHLORIDE 50 MG/ML
12.5 INJECTION INTRAMUSCULAR; INTRAVENOUS
Status: DISCONTINUED | OUTPATIENT
Start: 2023-07-27 | End: 2023-07-27 | Stop reason: HOSPADM

## 2023-07-27 RX ORDER — LIDOCAINE HYDROCHLORIDE 40 MG/ML
SOLUTION TOPICAL PRN
Status: DISCONTINUED | OUTPATIENT
Start: 2023-07-27 | End: 2023-07-27 | Stop reason: SURG

## 2023-07-27 RX ORDER — SODIUM CHLORIDE, SODIUM GLUCONATE, SODIUM ACETATE, POTASSIUM CHLORIDE AND MAGNESIUM CHLORIDE 526; 502; 368; 37; 30 MG/100ML; MG/100ML; MG/100ML; MG/100ML; MG/100ML
500 INJECTION, SOLUTION INTRAVENOUS CONTINUOUS
Status: DISCONTINUED | OUTPATIENT
Start: 2023-07-27 | End: 2023-07-27 | Stop reason: HOSPADM

## 2023-07-27 RX ORDER — OXYCODONE HYDROCHLORIDE 5 MG/1
5 TABLET ORAL EVERY 4 HOURS PRN
Qty: 12 TABLET | Refills: 0 | Status: SHIPPED | OUTPATIENT
Start: 2023-07-27 | End: 2023-07-30

## 2023-07-27 RX ORDER — HALOPERIDOL 5 MG/ML
1 INJECTION INTRAMUSCULAR
Status: DISCONTINUED | OUTPATIENT
Start: 2023-07-27 | End: 2023-07-27 | Stop reason: HOSPADM

## 2023-07-27 RX ORDER — ACETAMINOPHEN 325 MG/1
650 TABLET ORAL EVERY 6 HOURS
Qty: 60 TABLET | Refills: 0 | Status: SHIPPED | OUTPATIENT
Start: 2023-07-27

## 2023-07-27 RX ORDER — KETOROLAC TROMETHAMINE 30 MG/ML
INJECTION, SOLUTION INTRAMUSCULAR; INTRAVENOUS PRN
Status: DISCONTINUED | OUTPATIENT
Start: 2023-07-27 | End: 2023-07-27 | Stop reason: SURG

## 2023-07-27 RX ORDER — IBUPROFEN 200 MG
200 TABLET ORAL ONCE
Status: ON HOLD | COMMUNITY
End: 2023-07-27

## 2023-07-27 RX ORDER — CEFAZOLIN SODIUM 1 G/3ML
INJECTION, POWDER, FOR SOLUTION INTRAMUSCULAR; INTRAVENOUS PRN
Status: DISCONTINUED | OUTPATIENT
Start: 2023-07-27 | End: 2023-07-27 | Stop reason: SURG

## 2023-07-27 RX ORDER — LIDOCAINE HYDROCHLORIDE 20 MG/ML
INJECTION, SOLUTION EPIDURAL; INFILTRATION; INTRACAUDAL; PERINEURAL PRN
Status: DISCONTINUED | OUTPATIENT
Start: 2023-07-27 | End: 2023-07-27 | Stop reason: SURG

## 2023-07-27 RX ORDER — HYDROMORPHONE HYDROCHLORIDE 1 MG/ML
0.2 INJECTION, SOLUTION INTRAMUSCULAR; INTRAVENOUS; SUBCUTANEOUS
Status: DISCONTINUED | OUTPATIENT
Start: 2023-07-27 | End: 2023-07-27 | Stop reason: HOSPADM

## 2023-07-27 RX ORDER — OXYCODONE HCL 5 MG/5 ML
5 SOLUTION, ORAL ORAL
Status: COMPLETED | OUTPATIENT
Start: 2023-07-27 | End: 2023-07-27

## 2023-07-27 RX ADMIN — KETOROLAC TROMETHAMINE 30 MG: 30 INJECTION, SOLUTION INTRAMUSCULAR; INTRAVENOUS at 08:30

## 2023-07-27 RX ADMIN — ROCURONIUM BROMIDE 100 MG: 10 INJECTION, SOLUTION INTRAVENOUS at 07:39

## 2023-07-27 RX ADMIN — DEXAMETHASONE SODIUM PHOSPHATE 8 MG: 4 INJECTION INTRA-ARTICULAR; INTRALESIONAL; INTRAMUSCULAR; INTRAVENOUS; SOFT TISSUE at 07:42

## 2023-07-27 RX ADMIN — FENTANYL CITRATE 100 MCG: 50 INJECTION, SOLUTION INTRAMUSCULAR; INTRAVENOUS at 07:35

## 2023-07-27 RX ADMIN — ONDANSETRON 4 MG: 2 INJECTION INTRAMUSCULAR; INTRAVENOUS at 08:30

## 2023-07-27 RX ADMIN — OXYCODONE HYDROCHLORIDE 5 MG: 5 SOLUTION ORAL at 09:00

## 2023-07-27 RX ADMIN — LIDOCAINE HYDROCHLORIDE 4 ML: 40 SOLUTION TOPICAL at 07:39

## 2023-07-27 RX ADMIN — SUGAMMADEX 200 MG: 100 INJECTION, SOLUTION INTRAVENOUS at 08:30

## 2023-07-27 RX ADMIN — CEFAZOLIN 2 G: 1 INJECTION, POWDER, FOR SOLUTION INTRAMUSCULAR; INTRAVENOUS at 07:37

## 2023-07-27 RX ADMIN — PROPOFOL 150 MG: 10 INJECTION, EMULSION INTRAVENOUS at 07:39

## 2023-07-27 RX ADMIN — MIDAZOLAM 2 MG: 1 INJECTION, SOLUTION INTRAMUSCULAR; INTRAVENOUS at 07:35

## 2023-07-27 RX ADMIN — SODIUM CHLORIDE, POTASSIUM CHLORIDE, SODIUM LACTATE AND CALCIUM CHLORIDE: 600; 310; 30; 20 INJECTION, SOLUTION INTRAVENOUS at 07:34

## 2023-07-27 RX ADMIN — LIDOCAINE HYDROCHLORIDE 100 MG: 20 INJECTION, SOLUTION EPIDURAL; INFILTRATION; INTRACAUDAL at 07:39

## 2023-07-27 RX ADMIN — MEPERIDINE HYDROCHLORIDE 12.5 MG: 25 INJECTION INTRAMUSCULAR; INTRAVENOUS; SUBCUTANEOUS at 08:45

## 2023-07-27 ASSESSMENT — PAIN DESCRIPTION - PAIN TYPE
TYPE: SURGICAL PAIN

## 2023-07-27 ASSESSMENT — PAIN SCALES - GENERAL: PAIN_LEVEL: 1

## 2023-07-27 NOTE — OP REPORT
Operative Report    Date: 7/27/2023    Surgeon: Sher White M.D.     Assistant: AMADO Garcia    Pre-operative Diagnosis: left Inguinal Hernia    Post-operative Diagnosis: Bilateral Inguinal Hernia    Procedure: Robotic bilateral Inguinal Hernia Repair with Mesh    ASA Classification: II.    Indications: This is a 43 y.o. male who presented with symptoms of bilateral Inguinal Hernia. Here for repair    The indications for a surgical assistant in this surgery were indicated due to complexity of the procedure. Their role included aiding in incision, retraction, holding devices including camera for laparoscopic procedure, and closure of the wound.      Findings: bilateral direct inguinal hernia    During intraoperative examination of the pelvic floor a bilateral inguinal hernia was noted.  Given our robotic approach concurrent repair would be of a benefit to the patient and the intraoperative decision was made to repair a bilateral inguinal hernia in the same anesthetic setting.  Preoperatively an extensive conversation was had with the patient about this possibility and they understood the possibility and wished to proceed.    Wound Classification: Class I, I, Clean..    Procedure in detail: The patient was seen and examined in the preoperative holding area.  The risks benefits and alternatives of the procedure were discussed with the patient who wished to proceed with the procedure as described.  The patient was transferred to the operating room placed in supine position and all pressure points were properly padded.  General endotracheal anesthesia was induced and preoperative antibiotics were given per SCIP protocol.  Patient's abdomen was prepped with ChloraPrep and draped in the normal sterile fashion.  A timeout was performed confirming correct patient, correct procedure, and that all necessary equipment was in the room.      We began the procedure by performing a periumbilical Optiview approach.   The area for our camera port was identified and infused with local anesthetic, local anesthetic was subsequently infused over all port sites.  The skin was sharply incised and the 5 mm port was used to gain access to the abdomen.  Pneumoperitoneum was then achieved and maintained at 15 mmHg carbon dioxide throughout the entirety of the case.  Under direct visualization a right and left working port were placed with the 8 mm robotic port.  We then exchanged our 5 mm port for an 8 mm robotic port. the robot was then docked.  We began by identifying an area approximately 8 cm from the inguinal hernia and the peritoneum was sharply incised.  Using combination of blunt, electrocautery, and sharp dissection we were able to dissect the peritoneal flap down to the inguinal canal.  Careful dissection was completed medially to identify the pubic symphysis and carried this down to the obturator canal.  The corona mortise was identified and preserved.  We then continued dissection laterally until we encountered the psoas muscle ensuring that we maintain the iliopubic tract against the abdominal wall. A direct hernia sac was identified. Careful dissection was used to free the indirect inguinal hernia sac ensuring that the gonadal structures were carefully identified and preserved throughout the dissection of the hernia sac.  We carried our dissection down until the peritoneum was well below our area of mesh placement. Any identified cord lipoma was carefully dissected free and reduced into the preperitoneal space..     The peritoneal flap was then extended to the other side and the dissection was completed in a similar fashion. Two 15 x 10 pro- hernia meshes were selected and introduced.  These were then laid into the dissected peritoneal flap ensuring good coverage medially down the pubic symphysis over the midline covering the  space as well as good overlap over the inguinal canal anatomy on both sides.  The  peritoneal flap was manipulated to ensure that the mesh did not move.    The peritoneal flap was then closed with a 2-0 strata fix suture.  The perineal flap was then carefully inspected and any identified rents were closed carefully using the same 2-0 strata fix suture.  The robot was then undocked and the ports were carefully removed.  Skin was then closed with 4-0 Monocryl in a subcuticular fashion and Dermabond was placed over the wounds.    The patient was awakened from general anesthetic, and was taken to the recovery room in stable condition.    Sponge and needle counts were correct at the end of the case.     Specimen: none    EBL: 20mL    Dispo: stable, extubated, to PACU    Sher White M.D.  [facility]  [phone]

## 2023-07-27 NOTE — ANESTHESIA PREPROCEDURE EVALUATION
Case: [id number] Anesthesia Start Date/Time: 07/27/23 0734    Procedure: LAPAROSCOPIC ROBOT ASSISTED LEFT INGUINAL HERNIA REPAIR, POSSIBLE BILATERAL INGUINAL HERNIA REPAIR (Left: Groin)    Anesthesia type: General    Pre-op diagnosis: INGUINAL HERNIA    Location: [facility] OR 15 / SURGERY [facility]    Surgeons: Sher White M.D.          Relevant Problems   No relevant active problems       Physical Exam    Airway   Mallampati: III  TM distance: >3 FB  Neck ROM: limited       Cardiovascular - normal exam  Rhythm: regular  Rate: normal  (-) murmur     Dental - normal exam           Pulmonary - normal exam  Breath sounds clear to auscultation     Abdominal   (+) obese     Neurological - normal exam                 Anesthesia Plan    ASA 2       Plan - general       Airway plan will be ETT          Induction: intravenous    Postoperative Plan: Postoperative administration of opioids is intended.    Pertinent diagnostic labs and testing reviewed    Informed Consent:    Anesthetic plan and risks discussed with patient.    Use of blood products discussed with: patient whom consented to blood products.

## 2023-07-27 NOTE — ANESTHESIA PROCEDURE NOTES
Airway    Date/Time: 7/27/2023 7:39 AM    Performed by: Willard Arrington III, M.D.  Authorized by: Willard Arrington III, M.D.    Location:  OR  Urgency:  Elective  Difficult Airway: No    Indications for Airway Management:  Anesthesia      Spontaneous Ventilation: absent    Sedation Level:  Deep  Preoxygenated: Yes    Patient Position:  Sniffing  Final Airway Type:  Endotracheal airway  Final Endotracheal Airway:  ETT  Cuffed: Yes    Technique Used for Successful ETT Placement:  Direct laryngoscopy    Insertion Site:  Oral  Blade Type:  Salcedo  Laryngoscope Blade/Videolaryngoscope Blade Size:  3  ETT Size (mm):  7.5  Measured from:  Lips  ETT to Lips (cm):  22  Placement Verified by: auscultation and capnometry    Cormack-Lehane Classification:  Grade III - view of epiglottis only  Number of Attempts at Approach:  1

## 2023-07-27 NOTE — ANESTHESIA POSTPROCEDURE EVALUATION
Patient: Dennis Anderson    Procedure Summary     Date: 07/27/23 Room / Location: [place] [address] / SURGERY [facility]    Anesthesia Start: 0734 Anesthesia Stop: 0843    Procedure: LAPAROSCOPIC ROBOT ASSISTED BILATERAL INGUINAL HERNIA REPAIR, (Bilateral: Groin) Diagnosis: (BILATERAL INGUINAL HERNIA)    Surgeons: Sher White M.D. Responsible Provider: Willard Arrington III, M.D.    Anesthesia Type: general ASA Status: 2          Final Anesthesia Type: general  Last vitals  BP   Blood Pressure: 126/76    Temp   36.2 °C (97.1 °F)    Pulse   62   Resp   16    SpO2   90 %      Anesthesia Post Evaluation    Patient location during evaluation: PACU  Patient participation: complete - patient participated  Level of consciousness: awake and alert  Pain score: 1    Airway patency: patent  Anesthetic complications: no  Cardiovascular status: hemodynamically stable  Respiratory status: acceptable  Hydration status: euvolemic    PONV: none          There were no known notable events for this encounter.     Nurse Pain Score: 1 (NPRS)

## 2023-07-27 NOTE — ANESTHESIA TIME REPORT
Anesthesia Start and Stop Event Times     Date Time Event    7/27/2023 0719 Ready for Procedure     0734 Anesthesia Start     0843 Anesthesia Stop        Responsible Staff  07/27/23    Name Role Begin End    Willard Arrington III, M.D. Anesth 0734 0843        Overtime Reason:  no overtime (within assigned shift)    Comments:

## 2023-07-27 NOTE — OR NURSING
Arrived from PACU   Pt is A&O x4, VSS; denies N/V; states pain is at tolerable level. Dressing CDI to abdomen with x3 laps sites - dermabond, approximated edges - no bleeding noted.   Pt able to void in the bathroom without any difficulty   D/c orders received. IV dc'd.   Pt changed into clothing with assistance.   Discharge reviewed  Pt and family verbalized understanding and questions answered.   Patient states ready to d/c home.   Pt dc'd in w/c with friend - Sadaf.

## 2023-07-27 NOTE — DISCHARGE INSTRUCTIONS
HOME CARE INSTRUCTIONS    ACTIVITY: Rest and take it easy for the first 24 hours.  A responsible adult is recommended to remain with you during that time.  It is normal to feel sleepy.  We encourage you to not do anything that requires balance, judgment or coordination.    FOR 24 HOURS DO NOT:  Drive, operate machinery or run household appliances.  Drink beer or alcoholic beverages.  Make important decisions or sign legal documents.    SPECIAL INSTRUCTIONS: Inguinal Discharge Instructions:    ACTIVITIES: Upon discharge from the hospital, the day of surgery it is requested that you do no significant physical activity and limit mental activities, as you have had sedation. The day after surgery, you may resume activities of daily living, but for four weeks, it is recommended that you do no strenuous activities or heavy lifting (greater than 15 pounds).     DRIVING: You may drive whenever you are off pain medications and are able to perform the activities needed to drive, i.e. turning, bending, twisting, etc.     WOUND: It is not unusual for patients to experience swelling and even bruising at the hernia repair site. With inguinal hernias, sometimes the bruising and swelling may extend on to the penis or into the scrotum of male patients. This will resolve over the next few days.     ICE: please use ice on the wound to decrease the swelling for the first 24 hours and then discontinue.     BATHING: The dressing can be removed 48 hours after surgery and the wound can then be wetted in a shower as normal, but avoid submersion in water (tub bath) for at least 2 weeks.    PAIN MEDICATION: You will be given a prescription for pain medication at discharge. Please take these as directed. It is important to remember not to take medications on an empty stomach as this may cause nausea.     BOWEL FUNCTION: After hernia repair, it is not uncommon for patients to experience constipation. This is due to decreasing activity levels as  well as pain medications. You may wish to use a stool softener beginning immediately after surgery, and you may or may not need to use a laxative (Milk of Magnesia, Ex-lax; Senokot, etc.) as well.            CALL IF YOU HAVE: Drainage or fluid from incision that may be foul smelling,  increased tenderness or soreness at the wound or the wound edges are no  longer together,redness or swelling at the incision site. Please do not hesitate to  call with any other questions.     APPOINTMENT: Contact our office at [phone] for a follow-up appointment in 1 week following your procedure.     If you have any additional questions, please do not hesitate to call the office.      DIET: To avoid nausea, slowly advance diet as tolerated, avoiding spicy or greasy foods for the first day.  Add more substantial food to your diet according to your physician's instructions.  Babies can be fed formula or breast milk as soon as they are hungry.  INCREASE FLUIDS AND FIBER TO AVOID CONSTIPATION.    MEDICATIONS: Resume taking daily medication.  Take prescribed pain medication with food.  If no medication is prescribed, you may take non-aspirin pain medication if needed.  PAIN MEDICATION CAN BE VERY CONSTIPATING.  Take a stool softener or laxative such as senokot, pericolace, or milk of magnesia if needed.    Prescription at you [facility] pharmacy.  Last pain medication given at Oxycodone at 9:00.    A follow-up appointment should be arranged with your doctor in 1-2 weeks; call to schedule.    You should CALL YOUR PHYSICIAN if you develop:  Fever greater than 101 degrees F.  Pain not relieved by medication, or persistent nausea or vomiting.  Excessive bleeding (blood soaking through dressing) or unexpected drainage from the wound.  Extreme redness or swelling around the incision site, drainage of pus or foul smelling drainage.  Inability to urinate or empty your bladder within 8 hours.  Problems with breathing or chest pain.    You should  call [phone] if you develop problems with breathing or chest pain.  If you are unable to contact your doctor or surgical center, you should go to the nearest emergency room or urgent care center.  Physician's telephone #: [phone]    MILD FLU-LIKE SYMPTOMS ARE NORMAL.  YOU MAY EXPERIENCE GENERALIZED MUSCLE ACHES, THROAT IRRITATION, HEADACHE AND/OR SOME NAUSEA.    If any questions arise, call your doctor.  If your doctor is not available, please feel free to call the Surgical Center at [phone].  The Center is open Monday through Friday from 7AM to 7PM.      A registered nurse may call you a few days after your surgery to see how you are doing after your procedure.    You may also receive a survey in the mail within the next two weeks and we ask that you take a few moments to complete the survey and return it to us.  Our goal is to provide you with very good care and we value your comments.     Depression / Suicide Risk    As you are discharged from this [facility] facility, it is important to learn how to keep safe from harming yourself.    Recognize the warning signs:  Abrupt changes in personality, positive or negative- including increase in energy   Giving away possessions  Change in eating patterns- significant weight changes-  positive or negative  Change in sleeping patterns- unable to sleep or sleeping all the time   Unwillingness or inability to communicate  Depression  Unusual sadness, discouragement and loneliness  Talk of wanting to die  Neglect of personal appearance   Rebelliousness- reckless behavior  Withdrawal from people/activities they love  Confusion- inability to concentrate     If you or a loved one observes any of these behaviors or has concerns about self-harm, here's what you can do:  Talk about it- your feelings and reasons for harming yourself  Remove any means that you might use to hurt yourself (examples: pills, rope, extension cords, firearm)  Get professional help from the  community (Mental Health, Substance Abuse, psychological counseling)  Do not be alone:Call your Safe Contact- someone whom you trust who will be there for you.  Call your local CRISIS HOTLINE 781-0791 or 268-808-7987  Call your local Children's Mobile Crisis Response Team [place] (880) 194-6504 or www.[employer]  Call the toll free National Suicide Prevention Hotlines   National Suicide Prevention Lifeline 699-126-GDOA (7805)  [facility] Line Network 800-SUICIDE (298-2687)    I acknowledge receipt and understanding of these Home Care instructions.

## 2023-09-23 NOTE — ASSESSMENT & PLAN NOTE
Has surgery scheduled in a month   We take extra time to review surgery and post op expectations   I believe he will be very glad to do this surgery       
New problem uncertain prognosis   Nerve pain sharp shooting 4th and 5th finger now radiating to elbow   I explain this is ulnar neuropathy and likely triggered by resting elbow on his desk   Cure is correcting the underying aggravator   We look at elbow pads for him to buy   I also encourage him to consult with MSK     30+ min spent   
(V5) oriented

## 2024-06-13 ENCOUNTER — OFFICE VISIT (OUTPATIENT)
Dept: URGENT CARE | Facility: PHYSICIAN GROUP | Age: 44
End: 2024-06-13
Payer: COMMERCIAL

## 2024-06-13 VITALS
HEIGHT: 71 IN | DIASTOLIC BLOOD PRESSURE: 74 MMHG | OXYGEN SATURATION: 92 % | SYSTOLIC BLOOD PRESSURE: 116 MMHG | HEART RATE: 65 BPM | RESPIRATION RATE: 18 BRPM | WEIGHT: 315 LBS | BODY MASS INDEX: 44.1 KG/M2 | TEMPERATURE: 97.7 F

## 2024-06-13 DIAGNOSIS — L03.032 PARONYCHIA OF GREAT TOE OF LEFT FOOT: ICD-10-CM

## 2024-06-13 DIAGNOSIS — L60.0 INGROWN TOENAIL OF LEFT FOOT: ICD-10-CM

## 2024-06-13 PROCEDURE — 3074F SYST BP LT 130 MM HG: CPT | Performed by: PHYSICIAN ASSISTANT

## 2024-06-13 PROCEDURE — 3078F DIAST BP <80 MM HG: CPT | Performed by: PHYSICIAN ASSISTANT

## 2024-06-13 PROCEDURE — 99213 OFFICE O/P EST LOW 20 MIN: CPT | Performed by: PHYSICIAN ASSISTANT

## 2024-06-13 RX ORDER — CEPHALEXIN 500 MG/1
500 CAPSULE ORAL 4 TIMES DAILY
Qty: 20 CAPSULE | Refills: 0 | Status: SHIPPED | OUTPATIENT
Start: 2024-06-13 | End: 2024-06-18

## 2024-06-13 ASSESSMENT — ENCOUNTER SYMPTOMS: NEUROLOGICAL NEGATIVE: 1

## 2024-06-13 NOTE — PROGRESS NOTES
"  Subjective:     Dennis Anderson  is a 44 y.o. male who presents for Other (Ingrown, left big toe x 1 week )       He presents today with concerns of an ingrown toenail infection of the left great toe.  Symptoms have been ongoing the past week.  Notes that 2 to 3 days ago he did attempt to clip the toenail to improve his symptoms but they did get worse.  Notes drainage and discharge from the toe.  Has been using Epsom salt soaks for symptoms.  Denies any trauma or injury associate with symptom onset.     Review of Systems   Musculoskeletal:  Negative for joint pain.   Skin:         Redness and drainage from the left great toenail   Neurological: Negative.       No Known Allergies  Past Medical History:   Diagnosis Date    Hernia, inguinal, bilateral         Objective:   /74   Pulse 65   Temp 36.5 °C (97.7 °F) (Temporal)   Resp 18   Ht 1.803 m (5' 11\")   Wt (!) 147 kg (325 lb) Comment: TOOK WEIGHT IN CLINIC  SpO2 92%   BMI 45.33 kg/m²   Physical Exam  Vitals and nursing note reviewed.   Constitutional:       General: He is not in acute distress.     Appearance: He is not ill-appearing or toxic-appearing.   HENT:      Head: Normocephalic.      Nose: No rhinorrhea.   Eyes:      General: No scleral icterus.     Conjunctiva/sclera: Conjunctivae normal.   Pulmonary:      Effort: Pulmonary effort is normal. No respiratory distress.      Breath sounds: No stridor.   Musculoskeletal:      Cervical back: Neck supple.      Comments: Examination of the left great toe does reveal paronychia present on the lateral border of the great toenail.  Dried drainage and discharge present on exam.   Neurological:      Mental Status: He is alert and oriented to person, place, and time.   Psychiatric:         Mood and Affect: Mood normal.         Behavior: Behavior normal.         Thought Content: Thought content normal.         Judgment: Judgment normal.           Diagnostic testing: None    Assessment/Plan:     Encounter " Diagnoses   Name Primary?    Paronychia of great toe of left foot     Ingrown toenail of left foot      Procedure: Exploration of ingrown nail of left great toe     Risks and benefits discussed at length  Anesthesia: digital block with 1% lidocaine without epinephrine  Clean technique using sterile instruments  The ingrown nail was explored, lifted from the affected surrounding tissue. I did not remove any portion of the nail today.  Patient tolerated the  procedure well.       Plan for care for today's complaint includes start the patient on Keflex and mupirocin for paronychia present on the left great toe.  Did perform nail expiration today, please see above procedure details for more information.  Patient did not tolerate me placing a gauze wedge underneath the ingrown toenail but I did discuss how to perform this at home and encourage patient to do this over the next several weeks to promote the ingrown toenail to grow out without getting reinfected.  Continue to monitor symptoms and return to urgent care or follow-up with primary care provider if symptoms remain ongoing.  Follow-up in the emergency department if symptoms become severe, ER precautions discussed in office today..  Prescription for Keflex, mupirocin provided.    See AVS Instructions below for written guidance provided to patient on after-visit management and care in addition to our verbal discussion during the visit.    Please note that this dictation was created using voice recognition software. I have attempted to correct all errors, but there may be sound-alike, spelling, grammar and possibly content errors that I did not discover before finalizing the note.    Armandokyree Andres PA-C

## (undated) DEVICE — GLOVE SZ 7 BIOGEL PI MICRO - PF LF (50PR/BX 4BX/CA)

## (undated) DEVICE — BLADE SURGICAL #15 - (50/BX 3BX/CA)

## (undated) DEVICE — ROBOTIC SURGERY SERVICES

## (undated) DEVICE — GOWN SURGEONS X-LARGE - DISP. (30/CA)

## (undated) DEVICE — OBTURATOR BLADELESS STANDARD 8MM (6EA/BX)

## (undated) DEVICE — GLOVE BIOGEL INDICATOR SZ 8 SURGICAL PF LTX - (50/BX 4BX/CA)

## (undated) DEVICE — COVER TIP ENDOWRIST HOT SHEAR - (10EA/BX) DA VINCI

## (undated) DEVICE — DRAPE COLUMN  BOX OF 20

## (undated) DEVICE — SYSTEM CLEARIFY VISUALIZATION (10EA/PK)

## (undated) DEVICE — GLOVE BIOGEL PI INDICATOR SZ 8.0 SURGICAL PF LF -(50/BX 4BX/CA)

## (undated) DEVICE — LACTATED RINGERS INJ 1000 ML - (14EA/CA 60CA/PF)

## (undated) DEVICE — ELECTRODE DUAL RETURN W/ CORD - (50/PK)

## (undated) DEVICE — GLOVE SIZE 7.0 SURGEON ACCELERATOR FREE GREEN (50PR/BX 4BX/CA)

## (undated) DEVICE — TUBING CLEARLINK DUO-VENT - C-FLO (48EA/CA)

## (undated) DEVICE — GLOVE BIOGEL SZ 8 SURGICAL PF LTX - (50PR/BX 4BX/CA)

## (undated) DEVICE — SUTURE GENERAL

## (undated) DEVICE — SHEARS MONOPOLAR CURVED  DA VINCI 10X'S REUSABLE

## (undated) DEVICE — SUCTION INSTRUMENT YANKAUER BULBOUS TIP W/O VENT (50EA/CA)

## (undated) DEVICE — BIPOLAR FORCE DA VINCI 12X'S REISABLE

## (undated) DEVICE — SET LEADWIRE 5 LEAD BEDSIDE DISPOSABLE ECG (1SET OF 5/EA)

## (undated) DEVICE — SEAL 5MM-8MM UNIVERSAL  BOX OF 10

## (undated) DEVICE — SUTURE 4-0 MONOCRYL PLUS PS-2 - 27 INCH (36/BX)

## (undated) DEVICE — GOWN WARMING STANDARD FLEX - (30/CA)

## (undated) DEVICE — CANISTER SUCTION 3000ML MECHANICAL FILTER AUTO SHUTOFF MEDI-VAC NONSTERILE LF DISP  (40EA/CA)

## (undated) DEVICE — SENSOR OXIMETER ADULT SPO2 RD SET (20EA/BX)

## (undated) DEVICE — SUTURE 2-0 20CM STRATAFIX SPIRAL SH NEEDLE (12/BX)

## (undated) DEVICE — DERMABOND ADVANCED - (12EA/BX)

## (undated) DEVICE — COVER LIGHT HANDLE ALC PLUS DISP (18EA/BX)

## (undated) DEVICE — NEEDLE DRIVER MEGA SUTURECUT DA VINCI 15X'S REUSABLE

## (undated) DEVICE — SODIUM CHL IRRIGATION 0.9% 1000ML (12EA/CA)

## (undated) DEVICE — Device

## (undated) DEVICE — TROCAR 5X100 NON BLADED Z-TH - READ KII (6/BX)

## (undated) DEVICE — MAT PATIENT POSITIONING PREVALON (10EA/CA)

## (undated) DEVICE — GLOVE SZ 7.5 BIOGEL PI MICRO - PF LF (50PR/BX)

## (undated) DEVICE — DRAPE ARM  BOX OF 20

## (undated) DEVICE — GLOVE BIOGEL SZ 8.5 SURGICAL PF LTX - (50PR/BX 4BX/CA)

## (undated) DEVICE — SET EXTENSION WITH 2 PORTS (48EA/CA) ***PART #2C8610 IS A SUBSTITUTE*****

## (undated) DEVICE — SLEEVE, VASO, THIGH, MED